# Patient Record
Sex: FEMALE | Race: WHITE | URBAN - METROPOLITAN AREA
[De-identification: names, ages, dates, MRNs, and addresses within clinical notes are randomized per-mention and may not be internally consistent; named-entity substitution may affect disease eponyms.]

---

## 2017-02-26 ENCOUNTER — EMERGENCY (EMERGENCY)
Facility: HOSPITAL | Age: 62
LOS: 0 days | Discharge: HOME | End: 2017-02-26

## 2017-06-27 DIAGNOSIS — I10 ESSENTIAL (PRIMARY) HYPERTENSION: ICD-10-CM

## 2017-06-27 DIAGNOSIS — M25.512 PAIN IN LEFT SHOULDER: ICD-10-CM

## 2017-06-27 DIAGNOSIS — Z88.8 ALLERGY STATUS TO OTHER DRUGS, MEDICAMENTS AND BIOLOGICAL SUBSTANCES STATUS: ICD-10-CM

## 2018-07-14 ENCOUNTER — EMERGENCY (EMERGENCY)
Facility: HOSPITAL | Age: 63
LOS: 0 days | Discharge: HOME | End: 2018-07-14
Admitting: PHYSICIAN ASSISTANT

## 2018-07-14 VITALS
TEMPERATURE: 98 F | RESPIRATION RATE: 18 BRPM | OXYGEN SATURATION: 99 % | HEART RATE: 89 BPM | SYSTOLIC BLOOD PRESSURE: 148 MMHG | DIASTOLIC BLOOD PRESSURE: 79 MMHG

## 2018-07-14 DIAGNOSIS — M79.675 PAIN IN LEFT TOE(S): ICD-10-CM

## 2018-07-14 DIAGNOSIS — L08.9 LOCAL INFECTION OF THE SKIN AND SUBCUTANEOUS TISSUE, UNSPECIFIED: ICD-10-CM

## 2018-07-14 NOTE — ED PROVIDER NOTE - PHYSICAL EXAMINATION
Physical Exam    Vital Signs: I have reviewed the initial vital signs.  Constitutional: well-nourished, appears stated age, no acute distress  Skin: left big toe: + paronychia with purulent material noted beneath toe nail  Neuro: AOx3, Motor 5/5, Sensation: equal and intact

## 2018-07-14 NOTE — ED PROVIDER NOTE - OBJECTIVE STATEMENT
61 yo female hx of HTN, RA, fibromyalgia, anemia here for left big toe infection since yesterday. No fever. Patient has prior sx to left big toe with bunion implant removed in 2002.

## 2018-07-14 NOTE — ED PROVIDER NOTE - NS ED ROS FT
Constitutional: (-) fever  Skin: (+) left big toe infection  Neurological: (-) altered mental status

## 2018-08-07 ENCOUNTER — EMERGENCY (EMERGENCY)
Facility: HOSPITAL | Age: 63
LOS: 0 days | Discharge: HOME | End: 2018-08-07
Attending: EMERGENCY MEDICINE | Admitting: EMERGENCY MEDICINE

## 2018-08-07 VITALS
OXYGEN SATURATION: 98 % | TEMPERATURE: 98 F | DIASTOLIC BLOOD PRESSURE: 70 MMHG | RESPIRATION RATE: 18 BRPM | HEART RATE: 87 BPM | SYSTOLIC BLOOD PRESSURE: 127 MMHG

## 2018-08-07 VITALS
SYSTOLIC BLOOD PRESSURE: 130 MMHG | OXYGEN SATURATION: 97 % | HEART RATE: 85 BPM | DIASTOLIC BLOOD PRESSURE: 63 MMHG | RESPIRATION RATE: 20 BRPM | TEMPERATURE: 98 F

## 2018-08-07 DIAGNOSIS — R73.9 HYPERGLYCEMIA, UNSPECIFIED: ICD-10-CM

## 2018-08-07 DIAGNOSIS — M79.675 PAIN IN LEFT TOE(S): ICD-10-CM

## 2018-08-07 DIAGNOSIS — L08.9 LOCAL INFECTION OF THE SKIN AND SUBCUTANEOUS TISSUE, UNSPECIFIED: ICD-10-CM

## 2018-08-07 DIAGNOSIS — I10 ESSENTIAL (PRIMARY) HYPERTENSION: ICD-10-CM

## 2018-08-07 PROBLEM — D64.9 ANEMIA, UNSPECIFIED: Chronic | Status: ACTIVE | Noted: 2018-07-14

## 2018-08-07 LAB
ANION GAP SERPL CALC-SCNC: 15 MMOL/L — HIGH (ref 7–14)
BASOPHILS # BLD AUTO: 0.11 K/UL — SIGNIFICANT CHANGE UP (ref 0–0.2)
BASOPHILS NFR BLD AUTO: 1.2 % — HIGH (ref 0–1)
BUN SERPL-MCNC: 7 MG/DL — LOW (ref 10–20)
CALCIUM SERPL-MCNC: 9.2 MG/DL — SIGNIFICANT CHANGE UP (ref 8.5–10.1)
CHLORIDE SERPL-SCNC: 98 MMOL/L — SIGNIFICANT CHANGE UP (ref 98–110)
CO2 SERPL-SCNC: 27 MMOL/L — SIGNIFICANT CHANGE UP (ref 17–32)
CREAT SERPL-MCNC: <0.5 MG/DL — LOW (ref 0.7–1.5)
EOSINOPHIL # BLD AUTO: 0.15 K/UL — SIGNIFICANT CHANGE UP (ref 0–0.7)
EOSINOPHIL NFR BLD AUTO: 1.7 % — SIGNIFICANT CHANGE UP (ref 0–8)
GLUCOSE SERPL-MCNC: 274 MG/DL — HIGH (ref 70–99)
HCT VFR BLD CALC: 34.8 % — LOW (ref 37–47)
HGB BLD-MCNC: 10.3 G/DL — LOW (ref 12–16)
IMM GRANULOCYTES NFR BLD AUTO: 0.3 % — SIGNIFICANT CHANGE UP (ref 0.1–0.3)
LACTATE SERPL-SCNC: 1.3 MMOL/L — SIGNIFICANT CHANGE UP (ref 0.5–2.2)
LYMPHOCYTES # BLD AUTO: 2.53 K/UL — SIGNIFICANT CHANGE UP (ref 1.2–3.4)
LYMPHOCYTES # BLD AUTO: 28.6 % — SIGNIFICANT CHANGE UP (ref 20.5–51.1)
MCHC RBC-ENTMCNC: 20.2 PG — LOW (ref 27–31)
MCHC RBC-ENTMCNC: 29.6 G/DL — LOW (ref 32–37)
MCV RBC AUTO: 68.2 FL — LOW (ref 81–99)
MONOCYTES # BLD AUTO: 0.82 K/UL — HIGH (ref 0.1–0.6)
MONOCYTES NFR BLD AUTO: 9.3 % — SIGNIFICANT CHANGE UP (ref 1.7–9.3)
NEUTROPHILS # BLD AUTO: 5.21 K/UL — SIGNIFICANT CHANGE UP (ref 1.4–6.5)
NEUTROPHILS NFR BLD AUTO: 58.9 % — SIGNIFICANT CHANGE UP (ref 42.2–75.2)
NRBC # BLD: 0 /100 WBCS — SIGNIFICANT CHANGE UP (ref 0–0)
PLATELET # BLD AUTO: 300 K/UL — SIGNIFICANT CHANGE UP (ref 130–400)
POTASSIUM SERPL-MCNC: 4.2 MMOL/L — SIGNIFICANT CHANGE UP (ref 3.5–5)
POTASSIUM SERPL-SCNC: 4.2 MMOL/L — SIGNIFICANT CHANGE UP (ref 3.5–5)
RBC # BLD: 5.1 M/UL — SIGNIFICANT CHANGE UP (ref 4.2–5.4)
RBC # FLD: 17 % — HIGH (ref 11.5–14.5)
SODIUM SERPL-SCNC: 140 MMOL/L — SIGNIFICANT CHANGE UP (ref 135–146)
WBC # BLD: 8.85 K/UL — SIGNIFICANT CHANGE UP (ref 4.8–10.8)
WBC # FLD AUTO: 8.85 K/UL — SIGNIFICANT CHANGE UP (ref 4.8–10.8)

## 2018-08-07 RX ORDER — CEFAZOLIN SODIUM 1 G
1000 VIAL (EA) INJECTION ONCE
Qty: 0 | Refills: 0 | Status: COMPLETED | OUTPATIENT
Start: 2018-08-07 | End: 2018-08-07

## 2018-08-07 RX ORDER — AZTREONAM 2 G
1 VIAL (EA) INJECTION
Qty: 20 | Refills: 0 | OUTPATIENT
Start: 2018-08-07 | End: 2018-08-16

## 2018-08-07 RX ORDER — CEPHALEXIN 500 MG
1 CAPSULE ORAL
Qty: 40 | Refills: 0 | OUTPATIENT
Start: 2018-08-07 | End: 2018-08-16

## 2018-08-07 RX ORDER — TETANUS TOXOID, REDUCED DIPHTHERIA TOXOID AND ACELLULAR PERTUSSIS VACCINE, ADSORBED 5; 2.5; 8; 8; 2.5 [IU]/.5ML; [IU]/.5ML; UG/.5ML; UG/.5ML; UG/.5ML
0.5 SUSPENSION INTRAMUSCULAR ONCE
Qty: 0 | Refills: 0 | Status: COMPLETED | OUTPATIENT
Start: 2018-08-07 | End: 2018-08-07

## 2018-08-07 RX ADMIN — TETANUS TOXOID, REDUCED DIPHTHERIA TOXOID AND ACELLULAR PERTUSSIS VACCINE, ADSORBED 0.5 MILLILITER(S): 5; 2.5; 8; 8; 2.5 SUSPENSION INTRAMUSCULAR at 19:14

## 2018-08-07 RX ADMIN — Medication 100 MILLIGRAM(S): at 18:45

## 2018-08-07 NOTE — ED PROVIDER NOTE - MEDICAL DECISION MAKING DETAILS
The patient wishes to leave against medical advice.  I have discussed the risks, benefits and alternatives (including the possibility of worsening of disease, pain, permanent disability, and/or death) with the patient and his/her family (if available).  The patient voices understanding of these risks, benefits, and alternatives and still wishes to sign out against medical advice.  The patient is awake, alert, oriented  x 3 and has demonstrated capacity to refuse/direct care.  I have advised the patient that they can and should return immediately should they develop any worse/different/additional symptoms, or if they change their mind and want to continue their care.

## 2018-08-07 NOTE — ED PROVIDER NOTE - OBJECTIVE STATEMENT
63F pmh RA not on meds, HTN no meds, p/w L great toe infection. seen in ED 7/14 had I&D for paronychia, given augmentin, finished abx 10 day course. states symptoms of redness, swelling, pain never went away. no fever, chills. no truama. no numbness, weakness, tingling. no  hx DM. no pmd. no cp, sob.

## 2018-08-07 NOTE — ED ADULT NURSE NOTE - NSIMPLEMENTINTERV_GEN_ALL_ED
Implemented All Fall Risk Interventions:  [place] to call system. Call bell, personal items and telephone within reach. Instruct patient to call for assistance. Room bathroom lighting operational. Non-slip footwear when patient is off stretcher. Physically safe environment: no spills, clutter or unnecessary equipment. Stretcher in lowest position, wheels locked, appropriate side rails in place. Provide visual cue, wrist band, yellow gown, etc. Monitor gait and stability. Monitor for mental status changes and reorient to person, place, and time. Review medications for side effects contributing to fall risk. Reinforce activity limits and safety measures with patient and family.

## 2018-08-07 NOTE — ED PROVIDER NOTE - CARE PLAN
Principal Discharge DX:	Toe infection  Secondary Diagnosis:	Hyperglycemia Principal Discharge DX:	Toe infection  Assessment and plan of treatment:	L great toe cellulitis, failed i&D, po abx, will do labs, culture, XR, podiatry consult, re-kandice  Secondary Diagnosis:	Hyperglycemia

## 2018-08-07 NOTE — ED ADULT NURSE NOTE - OBJECTIVE STATEMENT
Pt presents to ED c/o left great toe infection . on po abx x 10 days. redness swelling and warmth noted . denies fever. Pt ambulatory with steady gait.

## 2018-08-07 NOTE — ED PROVIDER NOTE - PROGRESS NOTE DETAILS
Podiatry resident Aileen consulted [phone], will see pt Podiatry resident Aileen removed toenail, attempted i&d but no pus, dressed and cleaned wound, seen by Attending Dr Willingham, they recommended admission for iv abx and ID eval since failed po and i&d recently but pt refused, understands risk of death, loss of toe/limb, severe sepsis, severe morbidity/disability, has capacity to refuse, understands can change her mind anytime, will f/u dr willingham in 5-7 days, bactrim/keflex as outpt, strict return precautions provided. Podiatry resident Aileen removed toenail, attempted i&d but no pus, dressed and cleaned wound, seen by Attending Dr Willingham, they recommended admission for iv abx and ID eval since failed po and i&d recently but pt refused, understands risk of death, loss of toe/limb, severe sepsis, severe morbidity/disability, has capacity to refuse, understands can change her mind anytime, will f/u dr willingham in 5-7 days, bactrim/keflex as outpt, strict return precautions provided. map clinic info given for hyperglycemia

## 2018-08-07 NOTE — ED PROVIDER NOTE - NS ED ROS FT
Review of Systems:  	•	CONSTITUTIONAL - No fever, No diaphoresis, No weight change  	•	SKIN - No rash  	•	HEMATOLOGIC - No abnormal bleeding or bruising  	•	EYES - No eye pain, No blurred vision  	•	ENT - No change in hearing, No sore throat, No neck pain, No rhinorrhea, No ear pain  	•	RESPIRATORY - No shortness of breath, No cough  	•	CARDIAC - No chest pain, No palpitations  	•	GI - No abdominal pain, No nausea, No vomiting, No diarrhea, No constipation, No bright red blood per rectum or melena.                      •                 - No dysuria, frequency, hematuria.   	•	ENDO - No polydypsia, No polyuria, No heat/cold intolerance  	•	MUSCULOSKELETAL - No joint paint, No swelling, No back pain. +toe pain  	•	NEUROLOGIC - No numbness, No focal weakness, No headache, No dizziness

## 2018-08-07 NOTE — ED PROVIDER NOTE - PHYSICAL EXAMINATION
VITAL SIGNS: AFebrile, vital signs stable  CONSTITUTIONAL: Well-developed; well-nourished; in no acute distress.  SKIN: Skin exam is warm and dry, no acute rash.  HEAD: Normocephalic; atraumatic.  EYES: Pupils equal round reactive to light, Extraocular movements intact; conjunctiva and sclera clear.  ENT: No nasal discharge; airway clear. Moist mucus membranes.  NECK: Supple; non tender. No rigidity  CARD: Regular rate and rhythm. Normal S1, S2; no murmurs, gallops, or rubs.  RESP: Lungs clear to auscultation bilaterally. No wheezes, rales or rhonchi.  ABD: Abdomen soft; non-tender; non-distended;  no hepatosplenomegaly. No costovertebral angle tenderness.   EXT: Normal ROM. No clubbing, cyanosis or edema. No calf tenderness to palpation. L great toe erythema/edema/warmth +ttp at distal tip, no fluctuance, no discharge, no odor, FROM, 5/5 motor, SILT, 2+ dp pulse, normal gaitl   NEURO: Alert and oriented x 3. No focal deficits.  PSYCH: Cooperative, appropriate.

## 2018-08-07 NOTE — CONSULT NOTE ADULT - SUBJECTIVE AND OBJECTIVE BOX
PODIATRY CONSULT   AGUSTIN ZAPATA is a 63y old  Female who presents with a chief complaint of left hallux infection.  HPI:  63y old  Female with PMH of Anemia, RA, fibromyalgia, s/p Martines implant presents to ED w cc of left hallux infection with redness, swelling, warmth. Pt came to ED 7/14/18 with the same cc. Pt treated with I&D at bedside and PO Augmentin 10 days. Pt reports no improvement from PO ABx. Pt started to developed more swelling, redness, warmth, and pain. Pt reports small amounts of pus discharge. Pt soaks her feet daily in water/vinegar/salts. Daily dressing changes triple ABx ointment. Pt denies n/v/c/d/f/sob. Pt does not want to be admitted due to social issues. pt accompanied by her son      Anemia      PMH: Anemia    PSH:   Medication   Allergy: Demerol HCl (Unknown)        Labs:                        10.3   8.85  )-----------( 300      ( 07 Aug 2018 13:54 )             34.8       08-07    140  |  98  |  7<L>  ----------------------------<  274<H>  4.2   |  27  |  <0.5<L>    Ca    9.2      07 Aug 2018 13:54              O:   Derm: No open ulcers. edema, warmth, erythema, L hallux. no pus discharge + active sign of infection  Vascular: Dorsalis Pedis and Posterior Tibial pulses 2/4 b/l.  Capillary re-fill time less then 3 seconds digits 1-5 bilateral.  B/L feet warm to touch  Neuro: Protective sensation intact to the level of the digits bilateral.  MSK: Muscle strength 5/5 all major muscle groups bilateral.        Assessment and Plan:   Pt with cellulites L hallux  Chart reviewed and Patient evaluated  Discussed diagnosis and treatment with patient    Total nail avulsion  After sterile preparation of the area 10cc injection of 2% plain Lidocaine around the distal aspect of 1st met.   Prepping of the L hallux with betadine. Toe Tourniquet used for hemostasis.      Wound flush with normal saline  Applied --- with dry sterile dressing  Offloading to bilateral Heels.   Obtained wound culture to be sent to Pathology  X-rays ordered/reviewed : Shows -------  Recommend ID/Vascular consult  Continue IV abx as per ID  Arterial duplex ordered  Weight bearing/Non-weight bearing  to ------------  Discussed importance of daily foot examinations and proper shoe gear and to importance of lower Fasting Blood Glucose levels.   Podiatry will follow while in house.   will discuss care plan  with all  Attendings ------  Pt to OR for Excisional debridement  on non viable soft  tissue  ---- base down to subcutaneous tissue red granular base  Right/ Left foot ulceration  Medical clearance requested PODIATRY CONSULT   AGUSTIN ZAPATA is a 63y old  Female who presents with a chief complaint of left hallux infection.  HPI:  63y old  Female with PMH of Anemia, RA, fibromyalgia, s/p Martines implant presents to ED w cc of left hallux infection with redness, swelling, warmth. Pt came to ED 7/14/18 with the same cc. Pt treated with I&D at bedside and PO Augmentin 10 days. Pt reports no improvement from PO ABx. Pt started to developed more swelling, redness, warmth, and pain. Pt reports small amounts of pus discharge. Pt soaks her feet daily in water/vinegar/salts. Daily dressing changes triple ABx ointment. Pt denies n/v/c/d/f/sob. Pt does not want to be admitted due to social issues. pt accompanied by her son      Anemia      PMH: Anemia    PSH:   Medication   Allergy: Demerol HCl (Unknown)        Labs:                        10.3   8.85  )-----------( 300      ( 07 Aug 2018 13:54 )             34.8       08-07    140  |  98  |  7<L>  ----------------------------<  274<H>  4.2   |  27  |  <0.5<L>    Ca    9.2      07 Aug 2018 13:54              O:   Derm: No open ulcers. edema, warmth, erythema, L hallux. no pus discharge + active sign of infection  Vascular: Dorsalis Pedis and Posterior Tibial pulses 2/4 b/l.  Capillary re-fill time less then 3 seconds digits 1-5 bilateral.  B/L feet warm to touch  Neuro: Protective sensation intact to the level of the digits bilateral.  MSK: Muscle strength 5/5 all major muscle groups bilateral.        Assessment and Plan:   Pt with cellulites L hallux  Chart reviewed and Patient evaluated  Discussed diagnosis and treatment with patient    Total nail avulsion  After sterile preparation of the area 10cc injection of 2% plain Lidocaine around the distal aspect of 1st met.   Prepping of the L hallux with betadine. Toe Tourniquet used for hemostasis.  Removal of toe nail using steril tools from the laceration  kit.   No pus discharge.  <1cc of blood.    40 cc of Betadine/saline flushes   Dressing xeroform/DSD/Kerlix  Pt tolerated the procedure. No complications    X-rays : Shows no OM  Recommend ID consult  Dispense Darco shoe  Dressing changes instruction:  Clean the area with water and soap  Xeroform/DSD/Cling/Tape BID  NO Weight bearing restriction  Discussed care plan  with Attending  F/u with Dr. Willingham as o/p   [address] [place] [address] PODIATRY CONSULT   AGUSTIN ZAPATA is a 63y old  Female who presents with a chief complaint of left hallux infection.  HPI:  63y old  Female with PMH of Anemia, RA, fibromyalgia, s/p Martines implant presents to ED w cc of left hallux infection with redness, swelling, warmth. Pt came to ED 7/14/18 with the same cc. Pt treated with I&D at bedside and PO Amoxicillin 10 days. Pt reports no improvement from PO ABx. Pt started to developed more swelling, redness, warmth, and pain which comes and goes . Pt reports small amounts of pus discharge. Pt soaks her feet daily in water/vinegar/salts. Daily dressing changes triple ABx ointment. Pt denies n/v/c/d/f/sob. Pt does not want to be admitted due to social issues. pt accompanied by her son      Anemia      PMH: Anemia    PSH:   Medication   Allergy: Demerol HCl (Unknown)        Labs:                        10.3   8.85  )-----------( 300      ( 07 Aug 2018 13:54 )             34.8       08-07    140  |  98  |  7<L>  ----------------------------<  274<H>  4.2   |  27  |  <0.5<L>    Ca    9.2      07 Aug 2018 13:54              O:   Derm: No open ulcers. edema, warmth, erythema, L hallux. no pus discharge + active sign of infection  Vascular: Dorsalis Pedis and Posterior Tibial pulses 2/4 b/l.  Capillary re-fill time less then 3 seconds digits 1-5 bilateral.  B/L feet warm to touch  Neuro: Protective sensation intact to the level of the digits bilateral.  MSK: Muscle strength 5/5 all major muscle groups bilateral.        Assessment and Plan:   Pt with cellulites L hallux  Chart reviewed and Patient evaluated  Discussed diagnosis and treatment with patient    Total nail avulsion  After sterile preparation of the area 10cc injection of 2% plain Lidocaine around the distal aspect of 1st met.   Prepping of the L hallux with betadine. Toe Tourniquet used for hemostasis.  Removal of toe nail using steril tools from the laceration  kit.   No pus discharge.  <1cc of blood.    40 cc of Betadine/saline flushes   Dressing xeroform/DSD/Kerlix  Pt tolerated the procedure. No complications    X-rays : Shows no OM  Recommend ID consult  Dispense Darco shoe L foot  Dressing changes instruction:  Clean the area with water and soap  Xeroform/DSD/Cling/Tape BID  NO Weight bearing restriction  recommend tetanus shot  Discussed care plan  with Attending  F/u with Dr. Willingham as o/p   [address] [place] [address] PODIATRY CONSULT   GAUSTIN ZAPATA is a 63y old  Female who presents with a chief complaint of left hallux infection.  HPI:  63y old  Female with PMH of Anemia, RA, fibromyalgia, s/p Martines implant presents to ED w cc of left hallux infection with redness, swelling, warmth. Pt came to ED 7/14/18 with the same cc. Pt treated with I&D at bedside and PO Amoxicillin 10 days. Pt reports no improvement from PO ABx. Pt started to developed more swelling, redness, warmth, and pain which comes and goes . Pt reports small amounts of pus discharge. Pt soaks her feet daily in water/vinegar/salts. Daily dressing changes triple ABx ointment. Pt denies n/v/c/d/f/sob. Pt does not want to be admitted due to social issues. pt accompanied by her son      Anemia      PMH: Anemia    PSH:   Medication   Allergy: Demerol HCl (Unknown)        Labs:                        10.3   8.85  )-----------( 300      ( 07 Aug 2018 13:54 )             34.8       08-07    140  |  98  |  7<L>  ----------------------------<  274<H>  4.2   |  27  |  <0.5<L>    Ca    9.2      07 Aug 2018 13:54              O:   Derm: No open ulcers. edema, warmth, erythema, L hallux. no pus discharge +cellulitis  Vascular: Dorsalis Pedis and Posterior Tibial pulses 2/4 b/l.  Capillary re-fill time less then 3 seconds digits 1-5 bilateral.  B/L feet warm to touch  Neuro: Protective sensation intact to the level of the digits bilateral.  MSK: Muscle strength 5/5 all major muscle groups bilateral.        Assessment and Plan:   Cellulitis L hallux  Chart reviewed and Patient evaluated  Discussed diagnosis and treatment with patient    Total nail avulsion L hallux performed due to need to investigate potential soft tissue infection coming from diseased toenail  After sterile preparation of the area 10cc injection of 2% plain Lidocaine around the distal aspect of 1st met.   Prepping of the L hallux with betadine. Toe Tourniquet used for hemostasis.  Removal of toe nail using sterile tools from the laceration  kit.   No pus discharge.  <1cc of blood.    40 cc of Betadine/saline flushes   Dressing xeroform/DSD/Kerlix  Pt tolerated the procedure. No complications    X-rays : Shows no OM  Recommend ID consult  Recommend dose of IV antibiotics in ED until ID physician evaluates patient  Dispense Darco shoe L foot  Dressing changes instruction:  Clean the area with normal saline  Xeroform/DSD/[place]/Tape to do twice a day  No Weight bearing restriction  recommend tetanus shot  Discussed care plan  with Attending  F/u with Dr. Willingham as o/p   [address] [place] [address]

## 2018-08-07 NOTE — ED PEDIATRIC TRIAGE NOTE - CHIEF COMPLAINT QUOTE
patient has left great toe infection . on po abx x 10 days. redness swelling and warmth noted . denies fever

## 2018-08-07 NOTE — ED ADULT NURSE NOTE - EXPLANATION OF PATIENT'S REASON FOR LEAVING
Pt requesting to follow up outpatient. Pt educated of the risk of leaving AMA by MD including blood clots, CVA and death, pt verbalized understanding but leaving AMA. VSS no s/s distress observed. Shoe sized and provided to pt. Pt ambulatory with steady gait.

## 2018-08-13 LAB
CULTURE RESULTS: SIGNIFICANT CHANGE UP
SPECIMEN SOURCE: SIGNIFICANT CHANGE UP

## 2019-01-07 ENCOUNTER — OUTPATIENT (OUTPATIENT)
Dept: OUTPATIENT SERVICES | Facility: HOSPITAL | Age: 64
LOS: 1 days | Discharge: HOME | End: 2019-01-07

## 2019-01-07 DIAGNOSIS — D64.9 ANEMIA, UNSPECIFIED: ICD-10-CM

## 2019-01-07 DIAGNOSIS — E78.00 PURE HYPERCHOLESTEROLEMIA, UNSPECIFIED: ICD-10-CM

## 2019-01-07 DIAGNOSIS — G93.3 POSTVIRAL AND RELATED FATIGUE SYNDROMES: ICD-10-CM

## 2019-08-04 ENCOUNTER — EMERGENCY (EMERGENCY)
Facility: HOSPITAL | Age: 64
LOS: 0 days | Discharge: HOME | End: 2019-08-04
Attending: EMERGENCY MEDICINE | Admitting: EMERGENCY MEDICINE
Payer: COMMERCIAL

## 2019-08-04 VITALS
DIASTOLIC BLOOD PRESSURE: 78 MMHG | OXYGEN SATURATION: 98 % | HEART RATE: 91 BPM | HEIGHT: 62 IN | WEIGHT: 154.54 LBS | TEMPERATURE: 98 F | SYSTOLIC BLOOD PRESSURE: 140 MMHG | RESPIRATION RATE: 18 BRPM
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
## 2019-08-04 DIAGNOSIS — T85.694A OTHER MECHANICAL COMPLICATION OF INSULIN PUMP, INITIAL ENCOUNTER: ICD-10-CM
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
## 2019-08-04 PROCEDURE — 99282 EMERGENCY DEPT VISIT SF MDM: CPT

## 2019-08-04 NOTE — ED ADULT NURSE NOTE - NSIMPLEMENTINTERV_GEN_ALL_ED
Implemented All [place] Safety Interventions:  [place] to call system. Call bell, personal items and telephone within reach. Instruct patient to call for assistance. Room bathroom lighting operational. Non-slip footwear when patient is off stretcher. Physically safe environment: no spills, clutter or unnecessary equipment. Stretcher in lowest position, wheels locked, appropriate side rails in place.

## 2019-08-04 NOTE — ED PROVIDER NOTE - CROS ED CARDIOVAS ALL NEG
[address]    PATIENT'S NAME: Zakia WELLS   ATTENDING PHYSICIAN: Bharti Bolden D.O.   OPERATING PHYSICIAN: Modesta Gutierrez M.D.    PATIENT ACCOUNT#:   [de-identified]    LOCATION:  [address] [place] [address]  MEDICAL RECORD #:   [id number]       DATE OF BIRTH:  [birth year]  t: 02/26/2017 20:07:14  Job [id number]  / negative...

## 2019-08-04 NOTE — ED PROVIDER NOTE - CLINICAL SUMMARY MEDICAL DECISION MAKING FREE TEXT BOX
pt aware of FS, repeated her own FS with a manual machine and that one is working, aware of signs and symptoms to return for, insulin pump working properly, reports will follow up with pmd as well and obtain new machine tomorrow.

## 2019-08-04 NOTE — ED ADULT NURSE NOTE - CHPI ED NUR SYMPTOMS NEG
no dizziness/no vomiting/no decreased eating/drinking/no weakness/no nausea/no pain/no fever/no chills/no tingling

## 2019-08-04 NOTE — ED PROVIDER NOTE - CARE PLAN
Principal Discharge DX:	Insulin pump mechanical complication Principal Discharge DX:	Insulin pump mechanical complication  Assessment and plan of treatment:	Plan: FS, reassess.

## 2019-08-04 NOTE — ED PROVIDER NOTE - ATTENDING CONTRIBUTION TO CARE
65 y/o f w/ pmhx of dm with insulin pump, fibromyalgia, RA, presents for FS as pt reports and FS machine was not working, kept reading low so she ate more, and was not sure if it wad accurate. pt has a manual one where she takes her FS at home, used it in ed to make sure that work and Fs 1was 180 reports will use this one until tomorrow where she will get a new FS monitor. denies any symptoms. No fever, chills, n/v, cp,  pleuritic cp, sob, palpitations, diaphoresis, cough, ha/lh/dizziness, numbness/tingling, neck pain/ stiffness, abd pain, diarrhea, constipation, melena/brbpr, urinary symptoms, trauma, weakness, edema, calf pain/swelling/erythema, sick contacts, recent travel or rash.  Vital Signs: I have reviewed the initial vital signs. Constitutional: WDWN in nad. Sitting on stretcher speaking full sentences. Integumentary: No rash. ENT: MMM NECK: Supple, non-tender, no meningeal signs. Cardiovascular: RRR, radial pulses 2/4 b/l. No JVD. Respiratory: BS present b/l, ctabl, no wheezing or crackles, good resp effort and excursion, good air exchange,  no accessory muscle use, no stridor. Gastrointestinal: BS present throughout all 4 quadrants, soft, nd, nt, no rebound tenderness or guarding, no cvat. Musculoskeletal: FROM, no edema, no calf pain/swelling/erythema. Neurologic: AAOx3, motor 5/5 and sensation intact throughout upper and lowe ext, CN II-XII intact, No facial droop or slurring of speech. No focal deficits.

## 2019-08-04 NOTE — ED PROVIDER NOTE - NSFOLLOWUPINSTRUCTIONS_ED_ALL_ED_FT
Insulin Pumps  Image   An insulin pump is a small, computerized, battery-powered device that steadily (continuously) delivers insulin to the body. Insulin pumps may be used to treat type 1 diabetes or type 2 diabetes. They may be used with rapid-acting insulin or short-acting insulin.    An insulin pump has a container (cartridge or reservoir) of insulin that must be refilled regularly. The cartridge is connected to a needle that is placed into the tissue between skin and muscle in any of these areas:  Abdomen. Avoid any area that is less than 2 inches (5 cm) from the belly button (navel).  Front of thigh.  Upper, outer side of thigh.  Upper, outer part of arm.  Upper, outer part of buttock.  The area where the needle is inserted is called the infusion site. The needle is surrounded by a small plastic tube (cannula). Together, the needle and cannula are referred to as the infusion set. After the infusion set is inserted under the skin, the needle is removed and the cannula stays in place to deliver insulin to the body.    What does an insulin pump do?  Insulin pumps deliver insulin to the body in the following ways:  Basal rate. This method gives small, continuous amounts of insulin 24 hours a day.  Bolus dose. This is a specific amount of insulin that you can give yourself right away. You may need this after eating a meal or when you have high blood sugar (glucose). You will be instructed on the amount to give depending on your blood glucose level reading.  What are some advantages of using an insulin pump?  The advantages of using an insulin pump vary depending on which type of diabetes you have. Advantages may include:  Reduced need to give yourself multiple insulin injections. With an insulin pump, you need to insert a needle into your body one time every 2–3 days instead of every time that you need insulin.  More control over your diabetes and more flexibility for scheduling meals, snacks, and exercise.  Reduced risk of low blood glucose (hypoglycemia).  Better management of the increases in blood glucose in the early morning (shay phenomenon).  More precise insulin doses, which may mean an improvement in blood glucose levels.  A more predictable insulin absorption rate.  Easier delivery of basal rate insulin.  What are some disadvantages of using an insulin pump?  An insulin pump and its supplies might cost more than the supplies for injections.  You may need to check your blood glucose level more often than when you give yourself injections.  You have the pump attached to you wherever you go, for 24 hours a day.  What are the risks?  The infusion site can get irritated or infected. To avoid those risks:  Care for your skin.  Change the infusion set as directed by your health care provider.  Periodically move your infusion site to a slightly different area.  Pump failure. This can be caused by a malfunction, such as a blockage in the tubing, the needle moving out of place, or the pump running out of insulin.  Most pumps have an alarm to warn you of a malfunction. However, if the pump fails and you are unaware that it has failed, you may develop diabetic ketoacidosis. This is a life-threatening complication of diabetes that can occur due to lack of insulin.  Blockage (occlusion). This can prevent your pump from delivering insulin properly. This can be caused by a bent cannula or a kink in the tubing.  What may cause high blood glucose when using an insulin pump?  Possible causes of high blood glucose (hyperglycemia) when using an insulin pump include:  Tubing that breaks, leaks, bends, or moves out of place.  Low charge in a battery.  Infection at the infusion site. Signs of infection may include:  Redness, swelling, or pain.  Blood or cloudy fluid.  Warmth.  Pus or a bad smell.  A cartridge that is empty or incorrectly loaded into the pump.  A basal rate that needs to be adjusted.  Air bubbles in the tubing.  Illness or stress.  Changes in hormone levels, such as during a menstrual cycle.  The pump delivering the wrong amount of insulin.  An interruption in insulin delivery.  Poor absorption at the infusion site, even if you recently moved the site.  Using ineffective insulin. Insulin can become ineffective if it is outdated or exposed to extreme heat or cold.  A change in your normal activity level.  How to care for your insulin pump  Refill the insulin cartridge as often as needed. Do not let the cartridge get completely empty.  Always keep extra pump supplies, syringes, and insulin with you.  Store your insulin according to instructions on the packaging.  If you have questions or a problem that you cannot solve, call your health care provider or call the pump 's customer service line.  How to manage your diabetes while using an insulin pump  Image   Check your blood glucose 4 or more times a day, or as often as directed.  Always check your blood glucose at the following times:  Before you change your basal or bolus rates.  After you change your infusion set.  Check your A1c (hemoglobin A1c) level as often as directed.  Continue to manage your diabetes as directed. This includes:  Exercising regularly.  Eating healthy foods.  Managing your weight.  If you give yourself a correction (supplemental) insulin dose and your blood glucose stays high, check your urine for ketones. A supplemental dose is a specific amount of rapid-acting or short-acting insulin that is used to lower blood glucose if it is too high. You may be instructed to check your blood glucose at certain times of the day and to use corrective insulin as needed.  If you have negative ketones, check your pump to see if it is working properly. If your pump does not seem to be working properly, change your cartridge, infusion set, and insertion site. Recheck your blood glucose in 1 hour. If your blood glucose is still high, give yourself an additional supplemental insulin dose with an injection using a syringe and needle.  If you have moderate or large ketones in your urine, give yourself an additional supplemental insulin dose with an injection using a syringe and needle. Contact your health care provider for additional instructions.  Contact a health care provider if:  You have any of the following at your infusion site:  Redness, swelling, or pain.  Blood or cloudy fluid.  Warmth.  Pus or a bad smell.  A red streak on your skin that leads away from the infusion site.  You have a fever.  You have moderate or large ketone levels in your urine.  Your pump is not working properly.  Your blood glucose is higher or lower than the target range that your health care provider gave you.  Summary  An insulin pump is a small, computerized, battery-powered device that steadily (continuously) delivers insulin to the body.  Insulin pumps may be used to treat type 1 diabetes (type 1 diabetes mellitus) or type 2 diabetes (type 2 diabetes mellitus).  After the infusion set is inserted under the skin, the needle is removed and the cannula stays in place to deliver insulin to the body.  Always check your blood glucose before you change your basal or bolus rates, and after you change your infusion set.  Refill the insulin cartridge as often as needed. Do not let the cartridge get completely empty. Always keep extra pump supplies, syringes, and insulin with you.  This information is not intended to replace advice given to you by your health care provider. Make sure you discuss any questions you have with your health care provider.

## 2019-08-04 NOTE — ED ADULT NURSE NOTE - OBJECTIVE STATEMENT
patient complaints her insulin monitor is malfunctioning and patient wants to know her blood sugar level.

## 2019-08-04 NOTE — ED PROVIDER NOTE - GASTROINTESTINAL, MLM
Abdomen soft, non-tender, non distended, insulin pump at the LUQ with no redness, no pain, no rebound, no rigidity, no guarding.

## 2019-08-04 NOTE — ED PROVIDER NOTE - PROGRESS NOTE DETAILS
Discussed need to follow up with PMD. Discussed signs and symptoms to return to the ED for. Pt understands and agrees with discharge plan.

## 2019-08-04 NOTE — ED PROVIDER NOTE - OBJECTIVE STATEMENT
65 yo F with hx of DM with insulin pump, HTN not on meds, RA not on meds, presents for evaluation of malfunctioning insulin measuring device, onset today, with no associated symptoms. No fever, no chills, no headache, no nausea, no vomiting, no chest pain, no back pain, no abdominal pain, no SOB. Pt states that her device was telling her, the sugars were low, she compensated and then it read high as 430s. Pt states that she then switched to manual and her sugars have been at 160s. NO other symptoms

## 2020-06-07 ENCOUNTER — TRANSCRIPTION ENCOUNTER (OUTPATIENT)
Age: 65
End: 2020-06-07

## 2021-03-07 ENCOUNTER — EMERGENCY (EMERGENCY)
Facility: HOSPITAL | Age: 66
LOS: 0 days | Discharge: HOME | End: 2021-03-07
Attending: STUDENT IN AN ORGANIZED HEALTH CARE EDUCATION/TRAINING PROGRAM | Admitting: STUDENT IN AN ORGANIZED HEALTH CARE EDUCATION/TRAINING PROGRAM
Payer: COMMERCIAL

## 2021-03-07 ENCOUNTER — TRANSCRIPTION ENCOUNTER (OUTPATIENT)
Age: 66
End: 2021-03-07

## 2021-03-07 VITALS
WEIGHT: 177.03 LBS | OXYGEN SATURATION: 99 % | TEMPERATURE: 100 F | HEIGHT: 62 IN | DIASTOLIC BLOOD PRESSURE: 74 MMHG | SYSTOLIC BLOOD PRESSURE: 160 MMHG | RESPIRATION RATE: 18 BRPM | HEART RATE: 97 BPM

## 2021-03-07 DIAGNOSIS — R04.0 EPISTAXIS: ICD-10-CM

## 2021-03-07 DIAGNOSIS — Z88.8 ALLERGY STATUS TO OTHER DRUGS, MEDICAMENTS AND BIOLOGICAL SUBSTANCES STATUS: ICD-10-CM

## 2021-03-07 DIAGNOSIS — I10 ESSENTIAL (PRIMARY) HYPERTENSION: ICD-10-CM

## 2021-03-07 DIAGNOSIS — E11.9 TYPE 2 DIABETES MELLITUS WITHOUT COMPLICATIONS: ICD-10-CM

## 2021-03-07 DIAGNOSIS — E03.9 HYPOTHYROIDISM, UNSPECIFIED: ICD-10-CM

## 2021-03-07 LAB
ALBUMIN SERPL ELPH-MCNC: 4.1 G/DL — SIGNIFICANT CHANGE UP (ref 3.5–5.2)
ALP SERPL-CCNC: 144 U/L — HIGH (ref 30–115)
ALT FLD-CCNC: 17 U/L — SIGNIFICANT CHANGE UP (ref 0–41)
ANION GAP SERPL CALC-SCNC: 9 MMOL/L — SIGNIFICANT CHANGE UP (ref 7–14)
APTT BLD: 35.3 SEC — SIGNIFICANT CHANGE UP (ref 27–39.2)
AST SERPL-CCNC: 14 U/L — SIGNIFICANT CHANGE UP (ref 0–41)
BASOPHILS # BLD AUTO: 0.12 K/UL — SIGNIFICANT CHANGE UP (ref 0–0.2)
BASOPHILS NFR BLD AUTO: 1.1 % — HIGH (ref 0–1)
BILIRUB SERPL-MCNC: <0.2 MG/DL — SIGNIFICANT CHANGE UP (ref 0.2–1.2)
BLD GP AB SCN SERPL QL: SIGNIFICANT CHANGE UP
BUN SERPL-MCNC: 21 MG/DL — HIGH (ref 10–20)
CALCIUM SERPL-MCNC: 8.9 MG/DL — SIGNIFICANT CHANGE UP (ref 8.5–10.1)
CHLORIDE SERPL-SCNC: 107 MMOL/L — SIGNIFICANT CHANGE UP (ref 98–110)
CO2 SERPL-SCNC: 24 MMOL/L — SIGNIFICANT CHANGE UP (ref 17–32)
CREAT SERPL-MCNC: 0.6 MG/DL — LOW (ref 0.7–1.5)
EOSINOPHIL # BLD AUTO: 0.14 K/UL — SIGNIFICANT CHANGE UP (ref 0–0.7)
EOSINOPHIL NFR BLD AUTO: 1.3 % — SIGNIFICANT CHANGE UP (ref 0–8)
GLUCOSE SERPL-MCNC: 153 MG/DL — HIGH (ref 70–99)
HCT VFR BLD CALC: 34.2 % — LOW (ref 37–47)
HGB BLD-MCNC: 10.5 G/DL — LOW (ref 12–16)
IMM GRANULOCYTES NFR BLD AUTO: 0.5 % — HIGH (ref 0.1–0.3)
INR BLD: 1.14 RATIO — SIGNIFICANT CHANGE UP (ref 0.65–1.3)
LYMPHOCYTES # BLD AUTO: 2.13 K/UL — SIGNIFICANT CHANGE UP (ref 1.2–3.4)
LYMPHOCYTES # BLD AUTO: 20.1 % — LOW (ref 20.5–51.1)
MCHC RBC-ENTMCNC: 21.8 PG — LOW (ref 27–31)
MCHC RBC-ENTMCNC: 30.7 G/DL — LOW (ref 32–37)
MCV RBC AUTO: 71.1 FL — LOW (ref 81–99)
MONOCYTES # BLD AUTO: 0.84 K/UL — HIGH (ref 0.1–0.6)
MONOCYTES NFR BLD AUTO: 7.9 % — SIGNIFICANT CHANGE UP (ref 1.7–9.3)
NEUTROPHILS # BLD AUTO: 7.3 K/UL — HIGH (ref 1.4–6.5)
NEUTROPHILS NFR BLD AUTO: 69.1 % — SIGNIFICANT CHANGE UP (ref 42.2–75.2)
NRBC # BLD: 0 /100 WBCS — SIGNIFICANT CHANGE UP (ref 0–0)
PLATELET # BLD AUTO: 324 K/UL — SIGNIFICANT CHANGE UP (ref 130–400)
POTASSIUM SERPL-MCNC: 4.6 MMOL/L — SIGNIFICANT CHANGE UP (ref 3.5–5)
POTASSIUM SERPL-SCNC: 4.6 MMOL/L — SIGNIFICANT CHANGE UP (ref 3.5–5)
PROT SERPL-MCNC: 6.7 G/DL — SIGNIFICANT CHANGE UP (ref 6–8)
PROTHROM AB SERPL-ACNC: 13.1 SEC — HIGH (ref 9.95–12.87)
RBC # BLD: 4.81 M/UL — SIGNIFICANT CHANGE UP (ref 4.2–5.4)
RBC # FLD: 16.5 % — HIGH (ref 11.5–14.5)
SODIUM SERPL-SCNC: 140 MMOL/L — SIGNIFICANT CHANGE UP (ref 135–146)
WBC # BLD: 10.58 K/UL — SIGNIFICANT CHANGE UP (ref 4.8–10.8)
WBC # FLD AUTO: 10.58 K/UL — SIGNIFICANT CHANGE UP (ref 4.8–10.8)

## 2021-03-07 PROCEDURE — 99284 EMERGENCY DEPT VISIT MOD MDM: CPT

## 2021-03-07 RX ORDER — OXYMETAZOLINE HYDROCHLORIDE 0.5 MG/ML
2 SPRAY NASAL ONCE
Refills: 0 | Status: COMPLETED | OUTPATIENT
Start: 2021-03-07 | End: 2021-03-07

## 2021-03-07 RX ADMIN — OXYMETAZOLINE HYDROCHLORIDE 2 SPRAY(S): 0.5 SPRAY NASAL at 14:35

## 2021-03-07 NOTE — ED PROVIDER NOTE - PATIENT PORTAL LINK FT
You can access the FollowMyHealth Patient Portal offered by [facility] by registering at the following website: [email or web address]. By joining [facility]’s FollowMyHealth portal, you will also be able to view your health information using other applications (apps) compatible with our system.

## 2021-03-07 NOTE — ED PROVIDER NOTE - NSFOLLOWUPCLINICS_GEN_ALL_ED_FT
[facility]  ENT  [address], [address]nd [place] [address]  Phone: [phone]  Fax:   Follow Up Time:

## 2021-03-07 NOTE — ED PROVIDER NOTE - PROGRESS NOTE DETAILS
ENT consulted The patient wishes to leave against medical advice.  I have discussed the risks, benefits and alternatives (including the possibility of worsening of disease, pain, permanent disability, and/or death) with the patient and his/her family (if available).  The patient voices understanding of these risks, benefits, and alternatives and still wishes to sign out against medical advice.  The patient is awake, alert, oriented  x 3 and has demonstrated capacity to refuse/direct care.  I have advised the patient that they can and should return immediately should they develop any worse/different/additional symptoms, or if they change their mind and want to continue their care.

## 2021-03-07 NOTE — ED PROVIDER NOTE - CLINICAL SUMMARY MEDICAL DECISION MAKING FREE TEXT BOX
IMP: Epistaxis, in setting of prior epistaxis, fe infusion and ENT cautery. + hemostasis from b/l nasal packing [place] [facility] [place]. No further bleeding in ED. VSS. Seen by ENT. Plan to admit pt given b/l packing. pt declined, preferred to fup outpt. pt understood risk/ benefits, will fup w/ ENT. Pt understands signs and symptoms for ED return. Pt AMA'ed.

## 2021-03-07 NOTE — ED ADULT NURSE NOTE - OBJECTIVE STATEMENT
Pt reports spontaneous nosebleed starting this morning, pt denies fall denies injury, denies AC. Pt seen at urgent care today, nose packed at urgent care pt instructed to come to ED for ENT consult. patient with b/l nose packing

## 2021-03-07 NOTE — CONSULT NOTE ADULT - ASSESSMENT
65y female here for evaluation of b/l epistaxis. Epistaxis resolved after being packed by urgent care prior to. No evidence of active bleed at this time.     ·	 65y female here for evaluation of b/l epistaxis. Epistaxis resolved after being packed by urgent care prior to. No evidence of active bleed at this time.     ·	Placed mustache dressing to catch any drips  ·	No further ENT intervention indicated at this time  ·	Recommend Augmentin for gram positive coverage for the duration of the packing - explained the importance of abx to the patient for the duration of the packing, patient agreeable  ·	Patient educated on risks of nose picking/touching/scratching  ·	Patient to continue her BP medications, strict blood pressure control.  ·	Avoid nasal trauma; no nose rubbing, blowing or manipulating nasal packing.  Sneeze with mouth open and pinching nares.  ·	Avoid bending with head blow the waist, no heavy lifting.  ·	Instructed patient to f/u OP with ENT for packing removal in 48 to 72 hours

## 2021-03-07 NOTE — ED PROVIDER NOTE - ATTENDING CONTRIBUTION TO CARE
.  IMP: Epistaxis, in setting of prior epistaxis, fe infusion and ENT cautery. + hemostasis from b/l nasal packing applied in [facility] prior to arrival. No further bleeding in ED. VSS. Seen by ENT. Plan to admit pt given b/l packing. pt declined, preferred to fup outpt. pt understood risk/ benefits, will fup w/ ENT. Pt understands signs and symptoms for ED return. Pt AMA'ed.

## 2021-03-07 NOTE — ED PROVIDER NOTE - OBJECTIVE STATEMENT
65y F DM, HTN, Hypothyroid presents for eval of epistaxis. Pt has b/l epistaxis x3hrs, improved with b/l nasal packing at , no inciting factors. Pt has had prior episode requiring blood transfusion, iron transfusion and ENT cauterization. Denies fever, ha, lightheadedness, weakness

## 2021-03-07 NOTE — ED ADULT TRIAGE NOTE - CHIEF COMPLAINT QUOTE
Pt reports spontaneous nosebleed starting this morning, pt denies fall denies injury, denies AC. Pt seen at urgent care today, nose packed at urgent care pt instructed to come to ED for ENT consult.

## 2021-03-07 NOTE — ED PROVIDER NOTE - CARE PROVIDER_API CALL
Wood Hathaway)  Otolaryngology  [address] [place] [address], [address] [place] [address]  Phone: [phone]  Fax: [phone]  Follow Up Time:

## 2021-03-07 NOTE — ED PROVIDER NOTE - PHYSICAL EXAMINATION
CONST: NAD  EYES: PERRL, EOMI, Sclera and conjunctiva clear.   ENT: B/L nares packed. Oropharynx normal appearing, no erythema or exudates.  NECK: Non-tender, no meningeal signs. normal ROM. supple   CARD: S1 S2; No jvd  RESP: Equal BS B/L, No wheezes, rhonchi or rales. No distress  GI: Soft, non-tender, non-distended. normal BS  MS: Normal ROM in all extremities. pulses 2 +. no calf tenderness or swelling  SKIN: Warm, dry, no acute rashes. Good turgor  NEURO: A&Ox3, No focal deficits. Strength 5/5 with no sensory deficits. Steady gait.

## 2021-03-07 NOTE — ED PROVIDER NOTE - NSFOLLOWUPINSTRUCTIONS_ED_ALL_ED_FT
Follow up with PMD and ENT in 1-2 days.    Epistaxis    Epistaxis is the medical term for a nosebleed. Nosebleeds are common and can be caused by many conditions, such as injury, infections, dry environments, medicines, nose picking, and home heating and cooling systems. Try controlling your nosebleed by pinching your nose continuously for at least 10 minutes. Avoid lying down while you are having a nosebleed. Sit up and lean forward. Avoid blowing or sniffing your nose for a number of hours after having a nosebleed. Resume your normal activities as you are able, but avoid straining, lifting, or bending at the waist for several days. Maintain humidity in your home by using less air conditioning or by using a humidifier.     If your nose was packed by your health care provider, keep the packing inside of your nose until a health care provider removes it. If a balloon catheter was used to pack your nose, do not cut or remove it unless your health care provider has instructed you to do that.     Aspirin and blood thinners make bleeding more likely. If you are prescribed these medicines and you suffer from nosebleeds, ask your health care provider if you should stop taking the medicines or adjust the dose. Do not stop medicines unless directed by your health care provider.    SEEK IMMEDIATE MEDICAL CARE IF YOU HAVE ANY OF THE FOLLOWING SYMPTOMS: nosebleed lasting longer than 20 minutes, unusual bleeding from or bruising on other parts of your body, dizziness or lightheadedness, fainting, nosebleed occurring after a head injury, or fever.

## 2021-03-07 NOTE — ED ADULT NURSE NOTE - NSIMPLEMENTINTERV_GEN_ALL_ED
Implemented All Universal Safety Interventions:  [place] to call system. Call bell, personal items and telephone within reach. Instruct patient to call for assistance. Room bathroom lighting operational. Non-slip footwear when patient is off stretcher. Physically safe environment: no spills, clutter or unnecessary equipment. Stretcher in lowest position, wheels locked, appropriate side rails in place.

## 2021-03-07 NOTE — CONSULT NOTE ADULT - SUBJECTIVE AND OBJECTIVE BOX
ENT Consult Note: 65y female with a pmh of DM, HTN, Hypothyroid and hx of prior epistaxis requiring blood transfusion, iron transfusion and cauterization by ENT presents today for evaluation of b/l epistaxis. Patient states that her epistaxis began this morning when she was laying back relaxing on her couch. Patient said that it started out of nowhere and that she was not doing anything strenuous when the bleed began. Patient then attempted to stop the bleed by placing gauze into her nares at home but the bleeding continued prompting her to go the urgent care for further management of the bleed. Patient was packed b/l nasal packing in the urgent care and the bleed was stabilized. Patient was subsequently sent to the ED for further management and recommendations. Patient denies fever, chills, n/v, headache, dizziness, SOB, lightheadedness or weakness    PAST MEDICAL & SURGICAL HISTORY:  Anemia  DM  HTN  Hypothyroid    Allergies  Demerol HCl (Unknown)    FAMILY HISTORY:    Social History: No illicit drug use, nonsmoker    [ x ] A 10 Point Review of Systems was negative except where noted    Vital Signs Last 24 Hrs  T(C): 37.7 (07 Mar 2021 12:32), Max: 37.7 (07 Mar 2021 12:32)  T(F): 99.8 (07 Mar 2021 12:32), Max: 99.8 (07 Mar 2021 12:32)  HR: 97 (07 Mar 2021 12:32) (97 - 97)  BP: 160/74 (07 Mar 2021 12:32) (160/74 - 160/74)  RR: 18 (07 Mar 2021 12:32) (18 - 18)  SpO2: 99% (07 Mar 2021 12:32) (99% - 99%)    PHYSICAL EXAM:  Gen: Well-developed, well-nourished, NAD.  No drooling or pooling of secretions.  Good vocal quality, no hoarseness  Skin: No rashes, bruises, or lesions, good color non diaphoretic  HEENT: Head NC/AT. Nares with nasal packing in place b/l by urgent care, packing mildly saturated with blood, Oral cavity no erythema/edema. Tongue wnl, uvula midline, no tenderness throughout FOM. Posterior oropharynx clear, no blood/discharge noted.   Neck: Trachea midline, supple  Resp: Breathing easily, no accessory muscle use, no stridor or stertor.    Cardio: +S1/S2  Abd: Soft, nontender, nondistended  Neuro: Awake and alert  Psych: Normal mood, normal affect  Ext: No peripheral edema/cyanosis, ANTUNEZ x 4    LABS:             10.5   10.58 )-----------( 324      ( 07 Mar 2021 13:09 )             34.2     03-07    140  |  107  |  21<H>  ----------------------------<  153<H>  4.6   |  24  |  0.6<L>    Ca    8.9      07 Mar 2021 13:09    TPro  6.7  /  Alb  4.1  /  TBili  <0.2  /  DBili  x   /  AST  14  /  ALT  17  /  AlkPhos  144<H>  03-07    PT/INR - ( 07 Mar 2021 13:41 )   PT: 13.10 sec;   INR: 1.14 ratio      PTT - ( 07 Mar 2021 13:41 )  PTT:35.3 sec

## 2021-03-08 ENCOUNTER — APPOINTMENT (OUTPATIENT)
Dept: OTOLARYNGOLOGY | Facility: CLINIC | Age: 66
End: 2021-03-08
Payer: COMMERCIAL

## 2021-03-08 ENCOUNTER — NON-APPOINTMENT (OUTPATIENT)
Age: 66
End: 2021-03-08

## 2021-03-08 DIAGNOSIS — K27.9 PEPTIC ULCER, SITE UNSPECIFIED, UNSPECIFIED AS ACUTE OR CHRONIC, W/OUT HEMORRHAGE OR PERFORATION: ICD-10-CM

## 2021-03-08 DIAGNOSIS — D64.9 ANEMIA, UNSPECIFIED: ICD-10-CM

## 2021-03-08 DIAGNOSIS — Z78.9 OTHER SPECIFIED HEALTH STATUS: ICD-10-CM

## 2021-03-08 DIAGNOSIS — Q24.9 CONGENITAL MALFORMATION OF HEART, UNSPECIFIED: ICD-10-CM

## 2021-03-08 PROBLEM — Z00.00 ENCOUNTER FOR PREVENTIVE HEALTH EXAMINATION: Status: ACTIVE | Noted: 2021-03-08

## 2021-03-08 PROCEDURE — 99072 ADDL SUPL MATRL&STAF TM PHE: CPT

## 2021-03-08 PROCEDURE — 99203 OFFICE O/P NEW LOW 30 MIN: CPT

## 2021-03-08 NOTE — CONSULT LETTER
[Dear  ___] : Dear  [unfilled], [Consult Letter:] : I had the pleasure of evaluating your patient, [unfilled]. [Please see my note below.] : Please see my note below. [Consult Closing:] : Thank you very much for allowing me to participate in the care of this patient.  If you have any questions, please do not hesitate to contact me. [Sincerely,] : Sincerely, [FreeTextEntry2] : Roberto Driver MD [FreeTextEntry3] : Airam Mckeon MD\par Otolaryngology - Head & Neck Surgery\par

## 2021-03-08 NOTE — PHYSICAL EXAM
[de-identified] : bilateral nasal packs, merocels, no active bleeding [Midline] : trachea located in midline position [de-identified] : no active bleeding on posterior pharyngeal wall [Normal] : no rashes

## 2021-03-08 NOTE — HISTORY OF PRESENT ILLNESS
[de-identified] : Patient presents today due to epistaxis. Patient states bleeding started yesterday morning from she suspects bilateral  nostrils. Patient states bleeding wouldn’t stop so she went to urgent care, yesterday, bilateral packs placed, she continued to bleed, so they replaced them then sent her to the ED. In ED, blood work obtained, packs were not removed.  Patient admits BP is controlled, is on Losartan. No blood thinners. She admits h/o minor epistaxis, doesn't recall what side. She has had a few minor nose bleeds in the last few weeks. No reported bleeding problems during previous surgeries. No recent nasal trauma. 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 Does not usually nose sprays. Is on augmentin.

## 2021-03-08 NOTE — ASSESSMENT
[FreeTextEntry1] : - patient with nasal packs in place for only 24 hours at this point. Recommend another 24 hours of nasal packs in place\par - f/up tomorrow for eval for nasal pack removal\par - continue augmentin

## 2021-03-09 ENCOUNTER — APPOINTMENT (OUTPATIENT)
Dept: OTOLARYNGOLOGY | Facility: CLINIC | Age: 66
End: 2021-03-09
Payer: COMMERCIAL

## 2021-03-09 DIAGNOSIS — J34.89 OTHER SPECIFIED DISORDERS OF NOSE AND NASAL SINUSES: ICD-10-CM

## 2021-03-09 PROCEDURE — 99213 OFFICE O/P EST LOW 20 MIN: CPT | Mod: 25

## 2021-03-09 PROCEDURE — 99072 ADDL SUPL MATRL&STAF TM PHE: CPT

## 2021-03-09 PROCEDURE — 31238 NSL/SINS NDSC SRG NSL HEMRRG: CPT

## 2021-03-09 NOTE — HISTORY OF PRESENT ILLNESS
[de-identified] : Patient presents today due to epistaxis. Patient states bleeding started yesterday morning from she suspects bilateral  nostrils. Patient states bleeding wouldn’t stop so she went to urgent care, yesterday, bilateral packs placed, she continued to bleed, so they replaced them then sent her to the ED. In ED, blood work obtained, packs were not removed.  Patient admits BP is controlled, is on Losartan. No blood thinners. She admits h/o minor epistaxis, doesn't recall what side. She has had a few minor nose bleeds in the last few weeks. No reported bleeding problems during previous surgeries. No recent nasal trauma. 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 Does not usually nose sprays. Is on augmentin.  [FreeTextEntry1] : 3/9/21: Patient presents today following up on epistaxis. Patient denies any new changes since her last visit yesterday.

## 2021-03-09 NOTE — ASSESSMENT
[FreeTextEntry1] : packings removed.\par Cauterized and dressed.\par \par Note from DR Mckeon reviewed (3/8).\par Note from [facility] reviewed (3/7). \par \par I discussed with the patient the pathophysiology of anterior epistaxis. I showed on a drawing the location of the anterior vascular area. I explained the risk factors including but not limited to nasal dryness, and recommended nasal moisturizing and avoiding any intranasal trauma. I also demonstrated how to stop a bleeding if it happens again.\par \par RTC in 3 days.

## 2021-03-12 ENCOUNTER — APPOINTMENT (OUTPATIENT)
Dept: OTOLARYNGOLOGY | Facility: CLINIC | Age: 66
End: 2021-03-12
Payer: COMMERCIAL

## 2021-03-12 DIAGNOSIS — R04.0 EPISTAXIS: ICD-10-CM

## 2021-03-12 PROCEDURE — 99212 OFFICE O/P EST SF 10 MIN: CPT | Mod: 25

## 2021-03-12 PROCEDURE — 99072 ADDL SUPL MATRL&STAF TM PHE: CPT

## 2021-03-12 PROCEDURE — 31231 NASAL ENDOSCOPY DX: CPT

## 2021-03-12 NOTE — HISTORY OF PRESENT ILLNESS
[de-identified] : Patient presents today due to epistaxis. Patient states bleeding started yesterday morning from she suspects bilateral  nostrils. Patient states bleeding wouldn’t stop so she went to urgent care, yesterday, bilateral packs placed, she continued to bleed, so they replaced them then sent her to the ED. In ED, blood work obtained, packs were not removed.  Patient admits BP is controlled, is on Losartan. No blood thinners. She admits h/o minor epistaxis, doesn't recall what side. She has had a few minor nose bleeds in the last few weeks. No reported bleeding problems during previous surgeries. No recent nasal trauma. 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 Does not usually nose sprays. Is on augmentin. \par \par 3/9/21: Patient presents today following up on epistaxis. Patient denies any new changes since her last visit yesterday.  [FreeTextEntry1] : 3/12//21: Patient presents today following up on epistaxis.   Denies any recent bleeding

## 2021-04-16 ENCOUNTER — APPOINTMENT (OUTPATIENT)
Dept: OTOLARYNGOLOGY | Facility: CLINIC | Age: 66
End: 2021-04-16

## 2021-12-25 ENCOUNTER — EMERGENCY (EMERGENCY)
Facility: HOSPITAL | Age: 66
LOS: 0 days | Discharge: HOME | End: 2021-12-25
Attending: EMERGENCY MEDICINE | Admitting: EMERGENCY MEDICINE
Payer: MEDICARE

## 2021-12-25 VITALS
HEIGHT: 62 IN | SYSTOLIC BLOOD PRESSURE: 141 MMHG | WEIGHT: 177.91 LBS | OXYGEN SATURATION: 99 % | RESPIRATION RATE: 18 BRPM | HEART RATE: 101 BPM | TEMPERATURE: 101 F | DIASTOLIC BLOOD PRESSURE: 74 MMHG

## 2021-12-25 VITALS
SYSTOLIC BLOOD PRESSURE: 129 MMHG | OXYGEN SATURATION: 100 % | HEART RATE: 86 BPM | DIASTOLIC BLOOD PRESSURE: 62 MMHG | RESPIRATION RATE: 18 BRPM | TEMPERATURE: 101 F

## 2021-12-25 DIAGNOSIS — R63.0 ANOREXIA: ICD-10-CM

## 2021-12-25 DIAGNOSIS — E11.9 TYPE 2 DIABETES MELLITUS WITHOUT COMPLICATIONS: ICD-10-CM

## 2021-12-25 DIAGNOSIS — R50.9 FEVER, UNSPECIFIED: ICD-10-CM

## 2021-12-25 DIAGNOSIS — R53.1 WEAKNESS: ICD-10-CM

## 2021-12-25 DIAGNOSIS — I10 ESSENTIAL (PRIMARY) HYPERTENSION: ICD-10-CM

## 2021-12-25 DIAGNOSIS — M79.10 MYALGIA, UNSPECIFIED SITE: ICD-10-CM

## 2021-12-25 DIAGNOSIS — E03.9 HYPOTHYROIDISM, UNSPECIFIED: ICD-10-CM

## 2021-12-25 DIAGNOSIS — Z88.8 ALLERGY STATUS TO OTHER DRUGS, MEDICAMENTS AND BIOLOGICAL SUBSTANCES: ICD-10-CM

## 2021-12-25 DIAGNOSIS — Z20.822 CONTACT WITH AND (SUSPECTED) EXPOSURE TO COVID-19: ICD-10-CM

## 2021-12-25 LAB
ALBUMIN SERPL ELPH-MCNC: 4 G/DL — SIGNIFICANT CHANGE UP (ref 3.5–5.2)
ALP SERPL-CCNC: 142 U/L — HIGH (ref 30–115)
ALT FLD-CCNC: 30 U/L — SIGNIFICANT CHANGE UP (ref 0–41)
ANION GAP SERPL CALC-SCNC: 16 MMOL/L — HIGH (ref 7–14)
APPEARANCE UR: CLEAR — SIGNIFICANT CHANGE UP
AST SERPL-CCNC: 23 U/L — SIGNIFICANT CHANGE UP (ref 0–41)
BASOPHILS # BLD AUTO: 0.04 K/UL — SIGNIFICANT CHANGE UP (ref 0–0.2)
BASOPHILS NFR BLD AUTO: 0.6 % — SIGNIFICANT CHANGE UP (ref 0–1)
BILIRUB SERPL-MCNC: 0.3 MG/DL — SIGNIFICANT CHANGE UP (ref 0.2–1.2)
BILIRUB UR-MCNC: NEGATIVE — SIGNIFICANT CHANGE UP
BUN SERPL-MCNC: 9 MG/DL — LOW (ref 10–20)
CALCIUM SERPL-MCNC: 9.1 MG/DL — SIGNIFICANT CHANGE UP (ref 8.5–10.1)
CHLORIDE SERPL-SCNC: 100 MMOL/L — SIGNIFICANT CHANGE UP (ref 98–110)
CO2 SERPL-SCNC: 21 MMOL/L — SIGNIFICANT CHANGE UP (ref 17–32)
COLOR SPEC: YELLOW — SIGNIFICANT CHANGE UP
CREAT SERPL-MCNC: 0.6 MG/DL — LOW (ref 0.7–1.5)
DIFF PNL FLD: NEGATIVE — SIGNIFICANT CHANGE UP
EOSINOPHIL # BLD AUTO: 0.02 K/UL — SIGNIFICANT CHANGE UP (ref 0–0.7)
EOSINOPHIL NFR BLD AUTO: 0.3 % — SIGNIFICANT CHANGE UP (ref 0–8)
GLUCOSE SERPL-MCNC: 137 MG/DL — HIGH (ref 70–99)
GLUCOSE UR QL: NEGATIVE — SIGNIFICANT CHANGE UP
HCT VFR BLD CALC: 39.9 % — SIGNIFICANT CHANGE UP (ref 37–47)
HGB BLD-MCNC: 13.2 G/DL — SIGNIFICANT CHANGE UP (ref 12–16)
IMM GRANULOCYTES NFR BLD AUTO: 0.2 % — SIGNIFICANT CHANGE UP (ref 0.1–0.3)
KETONES UR-MCNC: ABNORMAL
LEUKOCYTE ESTERASE UR-ACNC: NEGATIVE — SIGNIFICANT CHANGE UP
LYMPHOCYTES # BLD AUTO: 0.49 K/UL — LOW (ref 1.2–3.4)
LYMPHOCYTES # BLD AUTO: 7.7 % — LOW (ref 20.5–51.1)
MCHC RBC-ENTMCNC: 27.2 PG — SIGNIFICANT CHANGE UP (ref 27–31)
MCHC RBC-ENTMCNC: 33.1 G/DL — SIGNIFICANT CHANGE UP (ref 32–37)
MCV RBC AUTO: 82.3 FL — SIGNIFICANT CHANGE UP (ref 81–99)
MONOCYTES # BLD AUTO: 0.91 K/UL — HIGH (ref 0.1–0.6)
MONOCYTES NFR BLD AUTO: 14.4 % — HIGH (ref 1.7–9.3)
NEUTROPHILS # BLD AUTO: 4.87 K/UL — SIGNIFICANT CHANGE UP (ref 1.4–6.5)
NEUTROPHILS NFR BLD AUTO: 76.8 % — HIGH (ref 42.2–75.2)
NITRITE UR-MCNC: NEGATIVE — SIGNIFICANT CHANGE UP
NRBC # BLD: 0 /100 WBCS — SIGNIFICANT CHANGE UP (ref 0–0)
PH UR: 6.5 — SIGNIFICANT CHANGE UP (ref 5–8)
PLATELET # BLD AUTO: 189 K/UL — SIGNIFICANT CHANGE UP (ref 130–400)
POTASSIUM SERPL-MCNC: 4.1 MMOL/L — SIGNIFICANT CHANGE UP (ref 3.5–5)
POTASSIUM SERPL-SCNC: 4.1 MMOL/L — SIGNIFICANT CHANGE UP (ref 3.5–5)
PROT SERPL-MCNC: 6.5 G/DL — SIGNIFICANT CHANGE UP (ref 6–8)
PROT UR-MCNC: SIGNIFICANT CHANGE UP
RBC # BLD: 4.85 M/UL — SIGNIFICANT CHANGE UP (ref 4.2–5.4)
RBC # FLD: 13.4 % — SIGNIFICANT CHANGE UP (ref 11.5–14.5)
SARS-COV-2 RNA SPEC QL NAA+PROBE: SIGNIFICANT CHANGE UP
SODIUM SERPL-SCNC: 137 MMOL/L — SIGNIFICANT CHANGE UP (ref 135–146)
SP GR SPEC: 1.02 — SIGNIFICANT CHANGE UP (ref 1.01–1.03)
UROBILINOGEN FLD QL: SIGNIFICANT CHANGE UP
WBC # BLD: 6.34 K/UL — SIGNIFICANT CHANGE UP (ref 4.8–10.8)
WBC # FLD AUTO: 6.34 K/UL — SIGNIFICANT CHANGE UP (ref 4.8–10.8)

## 2021-12-25 PROCEDURE — 71045 X-RAY EXAM CHEST 1 VIEW: CPT | Mod: 26

## 2021-12-25 PROCEDURE — 99284 EMERGENCY DEPT VISIT MOD MDM: CPT

## 2021-12-25 RX ORDER — ACETAMINOPHEN 500 MG
650 TABLET ORAL ONCE
Refills: 0 | Status: COMPLETED | OUTPATIENT
Start: 2021-12-25 | End: 2021-12-25

## 2021-12-25 RX ORDER — SODIUM CHLORIDE 9 MG/ML
2000 INJECTION, SOLUTION INTRAVENOUS ONCE
Refills: 0 | Status: COMPLETED | OUTPATIENT
Start: 2021-12-25 | End: 2021-12-25

## 2021-12-25 RX ORDER — ONDANSETRON 8 MG/1
4 TABLET, FILM COATED ORAL ONCE
Refills: 0 | Status: COMPLETED | OUTPATIENT
Start: 2021-12-25 | End: 2021-12-25

## 2021-12-25 RX ADMIN — SODIUM CHLORIDE 2000 MILLILITER(S): 9 INJECTION, SOLUTION INTRAVENOUS at 13:48

## 2021-12-25 RX ADMIN — ONDANSETRON 4 MILLIGRAM(S): 8 TABLET, FILM COATED ORAL at 13:48

## 2021-12-25 NOTE — ED PROVIDER NOTE - CHIEF COMPLAINT
The patient is a 66y Female complaining of abdominal pain. The patient is a 66y Female complaining of flu-like symptoms.

## 2021-12-25 NOTE — ED PROVIDER NOTE - PHYSICAL EXAMINATION
CONSTITUTIONAL: Well-developed; well-nourished; in no acute distress.   SKIN: warm, dry  HEAD: Normocephalic; atraumatic.  EYES: no conjunctival injection. PERRL.   ENT: No nasal discharge; airway clear.  NECK: Supple; non tender.  CARD: +Mildy tachycardic  RESP: No wheezes, rales or rhonchi.  ABD: soft ntnd  EXT: Normal ROM.  No clubbing, cyanosis or edema.   LYMPH: No acute cervical adenopathy.  NEURO: Alert, oriented, grossly unremarkable  PSYCH: Cooperative, appropriate.

## 2021-12-25 NOTE — ED PROVIDER NOTE - PATIENT PORTAL LINK FT
You can access the FollowMyHealth Patient Portal offered by [facility] by registering at the following website: [email or web address]. By joining [employer]’s FollowMyHealth portal, you will also be able to view your health information using other applications (apps) compatible with our system.

## 2021-12-25 NOTE — ED PROVIDER NOTE - ATTENDING CONTRIBUTION TO CARE
66 year old female, pmhx as documented presenting with body aches, decreased PO intake and worsening generalized weakness associated with subjective fevers x several days. Body aches are diffuse, achy, non-radiating, no palliative or provocative factors, mild severity. Otherwise denies vomiting, diarrhea, blood in stool, urinary symptoms or leg swelling. Also denies recent travel or sick contacts.    Vital Signs: I have reviewed the initial vital signs.  Constitutional: NAD, well-nourished, appears stated age, no acute distress.  HEENT: Airway patent, moist MM, no erythema/swelling/deformity of oral structures. EOMI, PERRLA.  CV: regular rate, regular rhythm, well-perfused extremities, 2+ b/l DP and radial pulses equal.  Lungs: BCTA, no increased WOB.  ABD: NTND, no guarding or rebound, no pulsatile mass, no hernias.   MSK: Neck supple, nontender, nl ROM, no stepoff. Chest nontender. Back nontender in TLS spine or to b/l bony structures or flanks. Ext nontender, nl rom, no deformity.   INTEG: Skin warm, dry, no rash.  NEURO: A&Ox3, normal strength, nl sensation throughout, normal speech.   PSYCH: Calm, cooperative, normal affect and interaction.    Overall well appearing. Will obtain labs, CXR, give IVF, symptomatic control, re-eval.

## 2021-12-25 NOTE — ED PROVIDER NOTE - CLINICAL SUMMARY MEDICAL DECISION MAKING FREE TEXT BOX
Patient presented with several days of fevers, generalized weakness, body aches. Otherwise HD stable, well appearing, no acute respiratory distress on RA. Obtained labs which were grossly unremarkable including no significant leukocytosis, anemia, signs of dehydration/ELLIOT, transaminitis or significant electrolyte abnormalities. CXR grossly negative and UA negative for infection. Patient remained stable on RA, and able to ambulate without difficulty or desaturation and felt better after tx in ED. Likely viral etiology. Given the above, will discharge home to quarantine. Patient agreeable with plan. Agrees to return to ED immediately for any new or worsening symptoms.

## 2021-12-25 NOTE — ED ADULT NURSE NOTE - NSIMPLEMENTINTERV_GEN_ALL_ED
Implemented All Universal Safety Interventions:  [place] to call system. Call bell, personal items and telephone within reach. Instruct patient to call for assistance. Room bathroom lighting operational. Non-slip footwear when patient is off stretcher. Physically safe environment: no spills, clutter or unnecessary equipment. Stretcher in lowest position, wheels locked, appropriate side rails in place.

## 2021-12-25 NOTE — ED PROVIDER NOTE - CARE PROVIDER_API CALL
Roberto Driver)  [address] [facility]  [address] [place] [address]  Phone: [phone]  Fax: [phone]  Follow Up Time:

## 2021-12-25 NOTE — ED PROVIDER NOTE - OBJECTIVE STATEMENT
66y F T2D, HTN, hypothyroidism presenting with body aches, decreased PO intake, and weakness xfew days. +fevers. No chest pain/sob. No abdominal pain. Vaccinated and boosted for covid. +nausea

## 2021-12-25 NOTE — ED PROVIDER NOTE - CARE PLAN
1 Principal Discharge DX:	Fever   Principal Discharge DX:	Fever  Secondary Diagnosis:	Generalized weakness

## 2021-12-25 NOTE — ED PROVIDER NOTE - NS ED ROS FT
Eyes:  No visual changes, eye pain or discharge.  ENMT:  No hearing changes, pain, no sore throat or runny nose, no difficulty swallowing  Cardiac:  No chest pain, SOB or edema. No chest pain with exertion.  Respiratory:  No cough or respiratory distress. No hemoptysis. No history of asthma or RAD.  GI:  see HPI  :  No dysuria, frequency or burning.  MS:  see HPI  Neuro:  No headache or weakness.  No LOC.  Skin:  No skin rash.   Endocrine: see HPI

## 2021-12-27 LAB
CULTURE RESULTS: SIGNIFICANT CHANGE UP
SPECIMEN SOURCE: SIGNIFICANT CHANGE UP

## 2022-01-21 ENCOUNTER — TRANSCRIPTION ENCOUNTER (OUTPATIENT)
Age: 67
End: 2022-01-21

## 2022-05-16 ENCOUNTER — NON-APPOINTMENT (OUTPATIENT)
Age: 67
End: 2022-05-16

## 2022-11-02 ENCOUNTER — APPOINTMENT (OUTPATIENT)
Dept: CARDIOLOGY | Facility: CLINIC | Age: 67
End: 2022-11-02
Payer: MEDICARE

## 2022-11-02 VITALS — SYSTOLIC BLOOD PRESSURE: 144 MMHG | DIASTOLIC BLOOD PRESSURE: 81 MMHG

## 2022-11-02 VITALS — HEART RATE: 68 BPM

## 2022-11-02 VITALS
TEMPERATURE: 96.7 F | WEIGHT: 187 LBS | HEART RATE: 70 BPM | BODY MASS INDEX: 33.13 KG/M2 | OXYGEN SATURATION: 100 % | HEIGHT: 63 IN

## 2022-11-02 DIAGNOSIS — E11.9 TYPE 2 DIABETES MELLITUS W/OUT COMPLICATIONS: ICD-10-CM

## 2022-11-02 PROCEDURE — 99204 OFFICE O/P NEW MOD 45 MIN: CPT | Mod: 25

## 2022-11-02 PROCEDURE — 93000 ELECTROCARDIOGRAM COMPLETE: CPT

## 2022-11-02 RX ORDER — INSULIN LISPRO 100 [IU]/ML
100 INJECTION, SOLUTION INTRAVENOUS; SUBCUTANEOUS
Qty: 30 | Refills: 0 | Status: ACTIVE | COMMUNITY
Start: 2022-04-19

## 2022-11-02 RX ORDER — LEVOTHYROXINE SODIUM 25 MCG
25 TABLET ORAL DAILY
Refills: 0 | Status: ACTIVE | COMMUNITY

## 2022-11-02 RX ORDER — INSULIN LISPRO 100 [IU]/ML
100 INJECTION, SOLUTION INTRAVENOUS; SUBCUTANEOUS
Qty: 3 | Refills: 0 | Status: ACTIVE | COMMUNITY
Start: 2022-06-27

## 2022-11-02 RX ORDER — ROSUVASTATIN CALCIUM 10 MG/1
10 TABLET, FILM COATED ORAL
Qty: 30 | Refills: 3 | Status: ACTIVE | COMMUNITY
Start: 2022-09-12

## 2022-11-02 RX ORDER — LOSARTAN POTASSIUM 100 MG/1
TABLET, FILM COATED ORAL DAILY
Refills: 0 | Status: ACTIVE | COMMUNITY

## 2022-11-02 RX ORDER — DULOXETINE HYDROCHLORIDE 30 MG/1
30 CAPSULE, DELAYED RELEASE PELLETS ORAL
Qty: 90 | Refills: 0 | Status: ACTIVE | COMMUNITY
Start: 2022-09-21

## 2022-11-02 RX ORDER — LEVOTHYROXINE SODIUM 0.17 MG/1
TABLET ORAL
Refills: 0 | Status: DISCONTINUED | COMMUNITY
End: 2022-11-02

## 2022-11-02 RX ORDER — MELOXICAM 7.5 MG/1
7.5 TABLET ORAL DAILY
Refills: 0 | Status: ACTIVE | COMMUNITY

## 2022-11-02 RX ORDER — MELOXICAM 15 MG/1
TABLET ORAL
Refills: 0 | Status: DISCONTINUED | COMMUNITY
End: 2022-11-02

## 2022-11-02 RX ORDER — PREGABALIN 300 MG/1
CAPSULE ORAL
Refills: 0 | Status: DISCONTINUED | COMMUNITY
End: 2022-11-02

## 2022-11-02 NOTE — ASSESSMENT
[FreeTextEntry1] : 67 year old woman with HTN and DM presents to establish care. \par \par 1. Palpitations: her symptoms happen about three times a week. \par - Will do a 14 day MCOT to rule out arrhythmias\par \par 2. Chest discomfort: Mostly happens while sitting. Will do an echocardiogram to rule out structural abnormalities. May consider stress testing due to her risk factors after echocardiogram results. \par \par 3. HTN: BP above goal today. Her goal is <130/80 mmhg. \par - She will keep a BP log and bring it to next visit\par \par 4. DM: she should be on a moderate intensity statin. Will increase rosuvastatin to 10mg daily.\par \par RTC in 4 weeks.

## 2022-11-02 NOTE — HISTORY OF PRESENT ILLNESS
[FreeTextEntry1] : RADHA ZAPATA is a 67 year year old woman with DM and HTN who presents to establish care. \par \par For the past three weeks, Radha has been having palpitations. There is no pattern to them, they come and go. 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 She is also having chest discomfort. It is not a pain or a tightness but it is a discomfort. It could happen at rest and with exertion. It is central and right sided. It is not associated with shortness of breath or diaphoresis. She has dizziness and balance issues for which she sees neurology. She has leg swelling and wears compression stockings. She denies orthopnea and PND. \par

## 2022-11-02 NOTE — REVIEW OF SYSTEMS
[SOB] : no shortness of breath [Chest Discomfort] : chest discomfort [Lower Ext Edema] : lower extremity edema [Palpitations] : palpitations [Dizziness] : dizziness [Numbness (Hypoesthesia)] : numbness [Weakness] : weakness [Limb Weakness (Paresis)] : limb weakness (Paresis) [Negative] : Heme/Lymph

## 2022-11-25 ENCOUNTER — APPOINTMENT (OUTPATIENT)
Dept: CARDIOLOGY | Facility: CLINIC | Age: 67
End: 2022-11-25
Payer: MEDICARE

## 2022-11-25 PROCEDURE — 93228 REMOTE 30 DAY ECG REV/REPORT: CPT

## 2022-12-15 ENCOUNTER — APPOINTMENT (OUTPATIENT)
Dept: CARDIOLOGY | Facility: CLINIC | Age: 67
End: 2022-12-15
Payer: MEDICARE

## 2022-12-15 VITALS
HEART RATE: 70 BPM | BODY MASS INDEX: 32.6 KG/M2 | RESPIRATION RATE: 16 BRPM | HEIGHT: 63 IN | TEMPERATURE: 97.1 F | OXYGEN SATURATION: 97 % | WEIGHT: 184 LBS

## 2022-12-15 VITALS — SYSTOLIC BLOOD PRESSURE: 136 MMHG | DIASTOLIC BLOOD PRESSURE: 79 MMHG

## 2022-12-15 DIAGNOSIS — R07.89 OTHER CHEST PAIN: ICD-10-CM

## 2022-12-15 DIAGNOSIS — I10 ESSENTIAL (PRIMARY) HYPERTENSION: ICD-10-CM

## 2022-12-15 DIAGNOSIS — R00.2 PALPITATIONS: ICD-10-CM

## 2022-12-15 PROCEDURE — 93306 TTE W/DOPPLER COMPLETE: CPT

## 2022-12-15 PROCEDURE — 99214 OFFICE O/P EST MOD 30 MIN: CPT

## 2022-12-15 NOTE — ASSESSMENT
[FreeTextEntry1] : 67 year old woman with HTN and DM presents for follow up today. \par \par 1. Palpitations: MCOT didn't show any sustained arrhythmias. Offered her a beta blocker for symptomatic use, but she says her symptoms are manageable and she would like to defer for now. \par \par 2. Chest discomfort: Echocardiogram showed normal LV size and function and no significant valve disease. She is still having chest pain. Will do a nuclear stress test for ischemia evaluation. \par \par 3. HTN: BP above goal today. Her goal is <130/80 mmhg. \par - She will keep a BP log and bring it to next visit\par \par 4. DM: she should be on a moderate intensity statin. Continue rosuvastatin to 10mg daily.\par \par RTC in 4 weeks.

## 2022-12-15 NOTE — HISTORY OF PRESENT ILLNESS
[FreeTextEntry1] : 67 year year old woman with DM and HTN who presents for follow up today. \par \par She initially came in for palpitations and chest discomfort. In terms of the chest discomfort, it could happen at rest and with exertion. It is central and right sided. It is not associated with shortness of breath or diaphoresis. She has dizziness and balance issues for which she sees neurology. She has leg swelling and wears compression stockings. She denies orthopnea and PND. The palpitations are stable.

## 2022-12-15 NOTE — REVIEW OF SYSTEMS
[Chest Discomfort] : chest discomfort [Lower Ext Edema] : lower extremity edema [Palpitations] : palpitations [Negative] : Heme/Lymph

## 2022-12-15 NOTE — CARDIOLOGY SUMMARY
[de-identified] : 11/2/22: normal sinus rhythm  [de-identified] : MCOT: 14 days: <1% PAC, <1% PVC [de-identified] : 12/15/22: Normal left and right ventricular size and systolic function. No significant valve disease. G1DD.

## 2023-01-26 ENCOUNTER — APPOINTMENT (OUTPATIENT)
Dept: CARDIOLOGY | Facility: CLINIC | Age: 68
End: 2023-01-26

## 2023-02-28 ENCOUNTER — EMERGENCY (EMERGENCY)
Facility: HOSPITAL | Age: 68
LOS: 0 days | Discharge: HOME | End: 2023-02-28
Attending: EMERGENCY MEDICINE
Payer: MEDICARE

## 2023-02-28 VITALS
HEIGHT: 63 IN | OXYGEN SATURATION: 99 % | RESPIRATION RATE: 18 BRPM | DIASTOLIC BLOOD PRESSURE: 90 MMHG | HEART RATE: 107 BPM | WEIGHT: 179.9 LBS | SYSTOLIC BLOOD PRESSURE: 216 MMHG

## 2023-02-28 VITALS
TEMPERATURE: 98 F | SYSTOLIC BLOOD PRESSURE: 140 MMHG | HEART RATE: 99 BPM | RESPIRATION RATE: 19 BRPM | OXYGEN SATURATION: 99 % | DIASTOLIC BLOOD PRESSURE: 81 MMHG

## 2023-02-28 DIAGNOSIS — Z88.5 ALLERGY STATUS TO NARCOTIC AGENT: ICD-10-CM

## 2023-02-28 DIAGNOSIS — R04.0 EPISTAXIS: ICD-10-CM

## 2023-02-28 DIAGNOSIS — I10 ESSENTIAL (PRIMARY) HYPERTENSION: ICD-10-CM

## 2023-02-28 DIAGNOSIS — E11.9 TYPE 2 DIABETES MELLITUS WITHOUT COMPLICATIONS: ICD-10-CM

## 2023-02-28 DIAGNOSIS — E03.9 HYPOTHYROIDISM, UNSPECIFIED: ICD-10-CM

## 2023-02-28 PROCEDURE — 30901 CONTROL OF NOSEBLEED: CPT | Mod: RT

## 2023-02-28 PROCEDURE — 99284 EMERGENCY DEPT VISIT MOD MDM: CPT | Mod: 25

## 2023-02-28 PROCEDURE — 99282 EMERGENCY DEPT VISIT SF MDM: CPT

## 2023-02-28 PROCEDURE — 30903 CONTROL OF NOSEBLEED: CPT

## 2023-02-28 NOTE — ED PROVIDER NOTE - NSFOLLOWUPINSTRUCTIONS_ED_ALL_ED_FT
Please make sure to follow up with your primary care doctor in 3 days.    Please make sure to return to this emergency department in 48 hours for reevaluation.     Our Emergency Department Referral Coordinators will be reaching out ot you in the next 24-48 hours from 9:00am to 5:00pm (Monday to Friday) with a follow up appointment. Please expect a phone call from the hospital in that time frame. If you do not receive a call or if you have any questions or concerns, you can reach them at [phone] or [phone].      Nosebleed    WHAT YOU NEED TO KNOW:    A nosebleed, or epistaxis, occurs when one or more of the blood vessels in your nose break. You may have dark or bright red blood from one or both nostrils. A nosebleed is most commonly caused by dry air or picking your nose. A direct blow to your nose, irritation from a cold or allergies, or a foreign object can also cause a nosebleed.     DISCHARGE INSTRUCTIONS:    Return to the emergency department if:     Your nasal packing is soaked with blood.      Your nose is still bleeding after 20 minutes, even after you pinch it.       You have a foul-smelling discharge coming out of your nose.      You feel so weak and dizzy that you have trouble standing up.      You have trouble breathing or talking.     Contact your healthcare provider if:     You have a fever and are vomiting.      You have pain in and around your nose that is getting worse even after you take pain medicines.      Your nasal pack is loose.      You have questions or concerns about your condition or care.    First aid:     Sit up and lean forward. This will help prevent you from swallowing blood. Spit blood and saliva into a bowl.       Apply pressure to your nose. Use 2 fingers to pinch your nose shut for 10 to 15 minutes. This will help stop the bleeding. Breathe through your mouth.            Apply ice on the bridge of your nose to decrease swelling and bleeding. Use a cold pack or put crushed ice in a plastic bag. Cover it with a towel to protect your skin.      Pack your nose with a cotton ball, tissue, tampon, or gauze bandage to stop the bleeding.    Medicines:     Medicines applied to a small piece of cotton and placed in your nose. Medicine may also be sprayed in or applied directly to your nose. You may need medicine to prevent an infection. If bleeding is severe, medicine may be injected into a blood vessel in your nose.       Take your medicine as directed. Contact your healthcare provider if you think your medicine is not helping or if you have side effects. Tell him of her if you are allergic to any medicine. Keep a list of the medicines, vitamins, and herbs you take. Include the amounts, and when and why you take them. Bring the list or the pill bottles to follow-up visits. Carry your medicine list with you in case of an emergency.    Prevent another nosebleed:     Keep your nose moist. Put a small amount of petroleum jelly inside your nostrils as needed. Use a saline (saltwater) nasal spray. Do not put anything else inside your nose unless your healthcare provider says it is okay. Do not use oil-based lubricants if you use oxygen therapy. They may be flammable.      Use a cool mist humidifier to increase air moisture in your home. This will help your nose stay moist.       Do not pick or blow your nose for at least a week. You can irritate or damage your nose if you pick it. Blowing your nose too hard may cause the bleeding to start again. Do not bend over or strain as this can cause the bleeding to start again.      Avoid irritants such as tobacco smoke or chemical sprays such as .    Follow up with your healthcare provider as directed: Any packing in your nose should be removed within 2 to 3 days. Write down your questions so you remember to ask them during your visits.        © Copyright [employer] 2019 All illustrations and images included in CareNotes are the copyrighted property of [employer] Inc[employer] or [employer].

## 2023-02-28 NOTE — ED PROVIDER NOTE - OBJECTIVE STATEMENT
67-year-old female with a past medical history of diabetes, hypertension, and hypothyroidism who presents with right-sided nosebleed.  Reports that symptoms started 1 hour prior to arrival; reports that bleeding started when she was at home.  Denies recent trauma or injury to her nose.  Reports that she had epistaxis 2 years ago that required packing.  Denies fever, lightheadedness, dizziness, shortness breath, chest pain, and bleeding from elsewhere.  Denies anticoagulant use.

## 2023-02-28 NOTE — ED PROVIDER NOTE - CLINICAL SUMMARY MEDICAL DECISION MAKING FREE TEXT BOX
67-year-old female with a past medical history of diabetes, hypertension, hypothyroidism presents with right-sided epistaxis.  Has had epistaxis in the past and required packing and also states she has had no chronic septal perforation.  States that the symptoms started 1 to 2 hours prior to arrival.  Denies shortness of breath chest pain, severe headache, or dizziness.  Denies anticoagulation use.  On exam hypertensive, vital signs stable otherwise, normal work of breathing, airway intact, does have slow oozing from the right nare and blood in the nasal passages bilaterally.  No pulsatile bleeding from the nares or in her oropharynx.  Attempted to remove clots and find source but no obvious anterior area to cauterize.  Bleeding did not improve with direct pressure by the patient.  Placed Rhino Rocket with hemostasis.  Vital signs improved.  Advised to follow-up with ENT

## 2023-02-28 NOTE — ED PROVIDER NOTE - PATIENT PORTAL LINK FT
You can access the FollowMyHealth Patient Portal offered by [facility] by registering at the following website: [email or web address]. By joining [employer]’s FollowMyHealth portal, you will also be able to view your health information using other applications (apps) compatible with our system.

## 2023-02-28 NOTE — ED PROVIDER NOTE - PHYSICAL EXAMINATION
CONSTITUTIONAL: in no apparent distress.   EYES: Pupils are round and reactive, extra-ocular muscles are intact. Eyelids are normal in appearance without swelling or lesions.   ENT: Hearing is intact with good acuity to spoken voice.  Active nose bleeding noticed in the R nostril and unable to identify the source. Patient is speaking clearly, not muffled and airway is intact.  RESPIRATORY: No signs of respiratory distress. Lung sounds are clear in all lobes bilaterally without rales, rhonchi, or wheezes.  CARDIOVASCULAR: Regular rate and rhythm.   NEURO: A & O x 3. Normal speech. No focal deficit.  PSYCHOLOGICAL: Appropriate mood and affect. Good judgement and insight.

## 2023-02-28 NOTE — ED PROVIDER NOTE - DIFFERENTIAL DIAGNOSIS
Differential Diagnosis epistaxis from anterior bleed, clincially not concerned for posterior bleeding especially after improvement post anterior rhinorocket

## 2023-02-28 NOTE — ED PROVIDER NOTE - PROGRESS NOTE DETAILS
Physical exam shows R epistaxis. Rhinorocket applied and bleeding stopped after observing for 1 hr. Pt is stable for discharge. Pt has been instructed to return to this ED in 48 hours for reevaluation and removal of rhinorocket. Pt is stable for discharge.

## 2023-02-28 NOTE — ED PROVIDER NOTE - NS ED ATTENDING STATEMENT MOD
This was a shared visit with the MUKUND. I reviewed and verified the documentation and independently performed the documented:

## 2023-03-02 ENCOUNTER — EMERGENCY (EMERGENCY)
Facility: HOSPITAL | Age: 68
LOS: 0 days | Discharge: HOME | End: 2023-03-02
Attending: EMERGENCY MEDICINE
Payer: MEDICARE

## 2023-03-02 VITALS
HEART RATE: 117 BPM | HEIGHT: 63 IN | RESPIRATION RATE: 18 BRPM | SYSTOLIC BLOOD PRESSURE: 124 MMHG | DIASTOLIC BLOOD PRESSURE: 70 MMHG | TEMPERATURE: 100 F | OXYGEN SATURATION: 99 % | WEIGHT: 184.09 LBS

## 2023-03-02 DIAGNOSIS — R04.0 EPISTAXIS: ICD-10-CM

## 2023-03-02 DIAGNOSIS — Z88.5 ALLERGY STATUS TO NARCOTIC AGENT: ICD-10-CM

## 2023-03-02 PROCEDURE — 99283 EMERGENCY DEPT VISIT LOW MDM: CPT

## 2023-03-02 PROCEDURE — 99282 EMERGENCY DEPT VISIT SF MDM: CPT

## 2023-03-02 NOTE — ED PROVIDER NOTE - OBJECTIVE STATEMENT
Patient is a 67-year-old female here for evaluation of nasal packing removal status postplacement 2 days ago after an episode of atraumatic right nare epistaxis.  Patient denies weakness, lightheadedness, palpitations

## 2023-03-02 NOTE — ED PROVIDER NOTE - PATIENT PORTAL LINK FT
You can access the FollowMyHealth Patient Portal offered by [facility] by registering at the following website: [email or web address]. By joining [employer]’s FollowMyHealth portal, you will also be able to view your health information using other applications (apps) compatible with our system.

## 2023-03-02 NOTE — ED PROVIDER NOTE - CLINICAL SUMMARY MEDICAL DECISION MAKING FREE TEXT BOX
Patient here for removal of nasal packing.  Patient was packed 2 days ago and wanted to have the packing removed from right nare.  Here the right nare packing was removed and the patient was oozing blood from the  right nare.  Surgicel was placed and the patient was observed in the ED.  The patient was eager to leave and the patient left the emerge department prior to prolonged watching for rebleed.  Patient is aware she will need to return or follow-up with ENT if she has rebleeding that occurs.

## 2023-03-02 NOTE — ED PROVIDER NOTE - CARE PROVIDER_API CALL
Wood Hathaway)  Otolaryngology  [address] [place] [address], [address] [place] [address]  Phone: [phone]  Fax: [phone]  Follow Up Time:

## 2023-03-02 NOTE — ED PROVIDER NOTE - PHYSICAL EXAMINATION
VITAL SIGNS: I have reviewed nursing notes and confirm.  CONSTITUTIONAL: Well-developed; well-nourished; in no acute distress.   SKIN: skin exam is warm and dry, no acute rash.    HEAD: Normocephalic; atraumatic.  EYES: conjunctiva and sclera clear.  ENT: Nasal packing in place No nasal discharge; airway clear.  EXT: Normal ROM.  No clubbing, cyanosis or edema.   NEURO: Alert, oriented, grossly unremarkable

## 2023-03-02 NOTE — ED ADULT TRIAGE NOTE - CHIEF COMPLAINT QUOTE
Patient with rhino rocket o R nare now causing pain and pressure. Patient requesting to have it removed.

## 2023-03-02 NOTE — ED PROVIDER NOTE - PROGRESS NOTE DETAILS
Nasal packing removed with persistent oozing, Surgicel placed in right nare will reassess Patient has not bled through Surgicel since it was placed requesting to be discharged home with follow-up for ENT.  Will DC, strict return precautions given

## 2023-03-02 NOTE — ED ADULT NURSE NOTE - NSICDXPASTMEDICALHX_GEN_ALL_CORE_FT
Patient PMHx anxiety, hyperlipidemia presents with feeling "shaky". denies palpitations, SOB, chest pain, nausea, vomiting, dizziness. admits to increased stress in her life. denies recreational drug use.
PAST MEDICAL HISTORY:  Anemia

## 2023-08-15 NOTE — ED PROVIDER NOTE - ENMT, MLM
Addended by: Lino Almeida on: 8/15/2023 04:06 PM     Modules accepted: Orders
Airway patent, Nasal mucosa clear. Mouth with normal mucosa. Throat has no vesicles, no oropharyngeal exudates and uvula is midline.

## 2023-11-12 ENCOUNTER — NON-APPOINTMENT (OUTPATIENT)
Age: 68
End: 2023-11-12

## 2023-11-27 ENCOUNTER — NON-APPOINTMENT (OUTPATIENT)
Age: 68
End: 2023-11-27

## 2023-11-28 ENCOUNTER — EMERGENCY (EMERGENCY)
Facility: HOSPITAL | Age: 68
LOS: 0 days | Discharge: HOME | End: 2023-11-28
Attending: STUDENT IN AN ORGANIZED HEALTH CARE EDUCATION/TRAINING PROGRAM
Payer: MEDICARE

## 2023-11-28 VITALS
OXYGEN SATURATION: 100 % | WEIGHT: 179.9 LBS | RESPIRATION RATE: 19 BRPM | SYSTOLIC BLOOD PRESSURE: 157 MMHG | TEMPERATURE: 98 F | DIASTOLIC BLOOD PRESSURE: 70 MMHG | HEART RATE: 78 BPM

## 2023-11-28 DIAGNOSIS — M79.661 PAIN IN RIGHT LOWER LEG: ICD-10-CM

## 2023-11-28 DIAGNOSIS — E11.9 TYPE 2 DIABETES MELLITUS WITHOUT COMPLICATIONS: ICD-10-CM

## 2023-11-28 DIAGNOSIS — E03.9 HYPOTHYROIDISM, UNSPECIFIED: ICD-10-CM

## 2023-11-28 DIAGNOSIS — Z88.6 ALLERGY STATUS TO ANALGESIC AGENT: ICD-10-CM

## 2023-11-28 DIAGNOSIS — I10 ESSENTIAL (PRIMARY) HYPERTENSION: ICD-10-CM

## 2023-11-28 DIAGNOSIS — Z86.718 PERSONAL HISTORY OF OTHER VENOUS THROMBOSIS AND EMBOLISM: ICD-10-CM

## 2023-11-28 PROCEDURE — 93970 EXTREMITY STUDY: CPT

## 2023-11-28 PROCEDURE — 93970 EXTREMITY STUDY: CPT | Mod: 26

## 2023-11-28 PROCEDURE — 73590 X-RAY EXAM OF LOWER LEG: CPT | Mod: RT

## 2023-11-28 PROCEDURE — 99285 EMERGENCY DEPT VISIT HI MDM: CPT

## 2023-11-28 PROCEDURE — 73590 X-RAY EXAM OF LOWER LEG: CPT | Mod: 26,RT

## 2023-11-28 PROCEDURE — 99284 EMERGENCY DEPT VISIT MOD MDM: CPT | Mod: 25

## 2023-11-28 RX ORDER — IBUPROFEN 200 MG
1 TABLET ORAL
Qty: 20 | Refills: 0
Start: 2023-11-28 | End: 2023-12-02

## 2023-11-28 RX ORDER — IBUPROFEN 200 MG
600 TABLET ORAL ONCE
Refills: 0 | Status: COMPLETED | OUTPATIENT
Start: 2023-11-28 | End: 2023-11-28

## 2023-11-28 RX ADMIN — Medication 600 MILLIGRAM(S): at 12:52

## 2023-11-28 NOTE — ED PROVIDER NOTE - CARE PROVIDER_API CALL
Gregorio Aragon  Vascular Surgery  [address] [place] [address], [address]  [place] [address]  Phone: [phone]  Fax: [phone]  Follow Up Time: 1-3 Days

## 2023-11-28 NOTE — ED PROVIDER NOTE - PATIENT PORTAL LINK FT
You can access the FollowMyHealth Patient Portal offered by [facility] by registering at the following website: [email or web address]. By joining [employer]’s FollowMyHealth portal, you will also be able to view your health information using other applications (apps) compatible with our system.

## 2023-11-28 NOTE — ED PROVIDER NOTE - PHYSICAL EXAMINATION
VITAL SIGNS: I have reviewed nursing notes and confirm.  CONSTITUTIONAL: non-toxic, well appearing  SKIN: no rash, no petechiae.  EYES: pink conjunctiva, anicteric  ENT: tongue midline, no exudates, MMM  NECK: Supple; no meningismus, no JVD  CARD: RRR, no murmurs   RESP: no respiratory distress, no tachypnea   EXT: Normal ROM x4. + R calf pain tenderness, no skin changes, distal pulses intact b/l, no LE edema   NEURO: Alert, oriented, equal strength bilaterally, nl gait.  PSYCH: Cooperative, appropriate.

## 2023-11-28 NOTE — ED ADULT TRIAGE NOTE - CHIEF COMPLAINT QUOTE
Patient complaining of Right leg pain, No redness or warmth noted in triage patient denies recent travel

## 2023-11-28 NOTE — ED PROVIDER NOTE - CLINICAL SUMMARY MEDICAL DECISION MAKING FREE TEXT BOX
69 yo F presented to ED with R leg pain.  Imaging was ordered and reviewed by me.  No fx or DVT. Appropriate medications for patient's presenting complaints were ordered and effects were reassessed.  Patient's records (prior hospital, ED visit, and/or nursing home notes if available) were reviewed.  Additional history was obtained from EMS, family, and/or PCP (where available).  Escalation to admission/observation was considered.  However patient feels much better and is comfortable with discharge.  Appropriate follow-up was arranged. Return precautions discussed in detail.

## 2023-11-28 NOTE — ED PROVIDER NOTE - CARE PLAN
Principal Discharge DX:	Leg pain  Assessment and plan of treatment:	plan- xr dvt study motrin reassess   1

## 2023-11-28 NOTE — ED PROVIDER NOTE - ATTENDING CONTRIBUTION TO CARE
DISPLAY PLAN FREE TEXT
69 yo F PMHx varicose veins follows w/ vascular, DM, HTN, hypothyroidism presents to ED for eval of R leg pain. Pt reports pain is at the R calf, no fevers, pain is 4 days intermittent. No numbness or tingling. Pt reports mechanical fall 2 months ago at work, no HT or LOC. No fevers or chills.     CONSTITUTIONAL: NAD.   SKIN: warm, dry  HEAD: Normocephalic; atraumatic.  EYES: no conjunctival injection.   ENT: MMM.   NECK: Supple.  CARD: RRR.   RESP: No wheezes, rales or rhonchi.  ABD: soft ntnd.   EXT: Normal ROM. +R calf pain tenderness distal pulses intact b/l no LE edema   NEURO: AAOx3, CN 2-12 grossly intact. +5/5 strength and sensation wnl in all extremities. No pronator drift, no dysarthria.   PSYCH: Cooperative, appropriate.

## 2023-11-28 NOTE — ED PROVIDER NOTE - OBJECTIVE STATEMENT
69 y/o F w pMHx of DM, HTN, low thyroid, varicose veins follows up with vascular presents to ED with R leg pain x 4 days, intermittent. Pt reports mechanical fall 2 months ago at work, no HT or LOC at that time. Neymar to ambulate w no difficulty. Denies f/c, cp, cough, sob, numbness or tingling.

## 2024-03-23 ENCOUNTER — NON-APPOINTMENT (OUTPATIENT)
Age: 69
End: 2024-03-23

## 2024-06-11 NOTE — ED ADULT NURSE NOTE - NS ED NURSE RECORD ANOTHER VITAL SIGN
Patient is having trouble getting his EMG scheduled, would like to speak with an RN to get some help. Please call him at [phone]   Yes

## 2025-07-16 ENCOUNTER — EMERGENCY (EMERGENCY)
Facility: HOSPITAL | Age: 70
LOS: 0 days | Discharge: HOME | End: 2025-07-16
Attending: STUDENT IN AN ORGANIZED HEALTH CARE EDUCATION/TRAINING PROGRAM
Payer: MEDICARE

## 2025-07-16 VITALS
RESPIRATION RATE: 16 BRPM | HEIGHT: 63 IN | DIASTOLIC BLOOD PRESSURE: 66 MMHG | HEART RATE: 71 BPM | WEIGHT: 195.99 LBS | OXYGEN SATURATION: 97 % | TEMPERATURE: 99 F | SYSTOLIC BLOOD PRESSURE: 141 MMHG

## 2025-07-16 DIAGNOSIS — N39.0 URINARY TRACT INFECTION, SITE NOT SPECIFIED: ICD-10-CM

## 2025-07-16 DIAGNOSIS — E11.9 TYPE 2 DIABETES MELLITUS WITHOUT COMPLICATIONS: ICD-10-CM

## 2025-07-16 DIAGNOSIS — Z87.442 PERSONAL HISTORY OF URINARY CALCULI: ICD-10-CM

## 2025-07-16 DIAGNOSIS — Z88.8 ALLERGY STATUS TO OTHER DRUGS, MEDICAMENTS AND BIOLOGICAL SUBSTANCES: ICD-10-CM

## 2025-07-16 DIAGNOSIS — I10 ESSENTIAL (PRIMARY) HYPERTENSION: ICD-10-CM

## 2025-07-16 DIAGNOSIS — Z98.84 BARIATRIC SURGERY STATUS: ICD-10-CM

## 2025-07-16 DIAGNOSIS — Z90.710 ACQUIRED ABSENCE OF BOTH CERVIX AND UTERUS: ICD-10-CM

## 2025-07-16 DIAGNOSIS — E78.5 HYPERLIPIDEMIA, UNSPECIFIED: ICD-10-CM

## 2025-07-16 LAB
ALBUMIN SERPL ELPH-MCNC: 4.1 G/DL — SIGNIFICANT CHANGE UP (ref 3.5–5.2)
ALP SERPL-CCNC: 137 U/L — HIGH (ref 30–115)
ALT FLD-CCNC: 22 U/L — SIGNIFICANT CHANGE UP (ref 0–41)
ANION GAP SERPL CALC-SCNC: 11 MMOL/L — SIGNIFICANT CHANGE UP (ref 7–14)
APPEARANCE UR: ABNORMAL
AST SERPL-CCNC: 19 U/L — SIGNIFICANT CHANGE UP (ref 0–41)
BACTERIA # UR AUTO: ABNORMAL /HPF
BASOPHILS # BLD AUTO: 0.09 K/UL — SIGNIFICANT CHANGE UP (ref 0–0.2)
BASOPHILS NFR BLD AUTO: 1 % — SIGNIFICANT CHANGE UP (ref 0–1)
BILIRUB SERPL-MCNC: 0.3 MG/DL — SIGNIFICANT CHANGE UP (ref 0.2–1.2)
BILIRUB UR-MCNC: NEGATIVE — SIGNIFICANT CHANGE UP
BUN SERPL-MCNC: 15 MG/DL — SIGNIFICANT CHANGE UP (ref 10–20)
CALCIUM SERPL-MCNC: 8.8 MG/DL — SIGNIFICANT CHANGE UP (ref 8.4–10.5)
CAST: 0 /LPF — SIGNIFICANT CHANGE UP (ref 0–4)
CHLORIDE SERPL-SCNC: 103 MMOL/L — SIGNIFICANT CHANGE UP (ref 98–110)
CO2 SERPL-SCNC: 24 MMOL/L — SIGNIFICANT CHANGE UP (ref 17–32)
COLOR SPEC: YELLOW — SIGNIFICANT CHANGE UP
CREAT SERPL-MCNC: 0.5 MG/DL — LOW (ref 0.7–1.5)
DIFF PNL FLD: NEGATIVE — SIGNIFICANT CHANGE UP
EGFR: 101 ML/MIN/1.73M2 — SIGNIFICANT CHANGE UP
EGFR: 101 ML/MIN/1.73M2 — SIGNIFICANT CHANGE UP
EOSINOPHIL # BLD AUTO: 0.28 K/UL — SIGNIFICANT CHANGE UP (ref 0–0.7)
EOSINOPHIL NFR BLD AUTO: 3 % — SIGNIFICANT CHANGE UP (ref 0–8)
GLUCOSE SERPL-MCNC: 176 MG/DL — HIGH (ref 70–99)
GLUCOSE UR QL: 500 MG/DL
HCT VFR BLD CALC: 39.8 % — SIGNIFICANT CHANGE UP (ref 37–47)
HGB BLD-MCNC: 13.6 G/DL — SIGNIFICANT CHANGE UP (ref 12–16)
IMM GRANULOCYTES NFR BLD AUTO: 0.4 % — HIGH (ref 0.1–0.3)
KETONES UR QL: ABNORMAL MG/DL
LEUKOCYTE ESTERASE UR-ACNC: ABNORMAL
LIDOCAIN IGE QN: 18 U/L — SIGNIFICANT CHANGE UP (ref 7–60)
LYMPHOCYTES # BLD AUTO: 1.62 K/UL — SIGNIFICANT CHANGE UP (ref 1.2–3.4)
LYMPHOCYTES # BLD AUTO: 17.4 % — LOW (ref 20.5–51.1)
MCHC RBC-ENTMCNC: 28.9 PG — SIGNIFICANT CHANGE UP (ref 27–31)
MCHC RBC-ENTMCNC: 34.2 G/DL — SIGNIFICANT CHANGE UP (ref 32–37)
MCV RBC AUTO: 84.5 FL — SIGNIFICANT CHANGE UP (ref 81–99)
MONOCYTES # BLD AUTO: 0.77 K/UL — HIGH (ref 0.1–0.6)
MONOCYTES NFR BLD AUTO: 8.3 % — SIGNIFICANT CHANGE UP (ref 1.7–9.3)
NEUTROPHILS # BLD AUTO: 6.49 K/UL — SIGNIFICANT CHANGE UP (ref 1.4–6.5)
NEUTROPHILS NFR BLD AUTO: 69.9 % — SIGNIFICANT CHANGE UP (ref 42.2–75.2)
NITRITE UR-MCNC: POSITIVE
NRBC BLD AUTO-RTO: 0 /100 WBCS — SIGNIFICANT CHANGE UP (ref 0–0)
PH UR: 5.5 — SIGNIFICANT CHANGE UP (ref 5–8)
PLATELET # BLD AUTO: 215 K/UL — SIGNIFICANT CHANGE UP (ref 130–400)
PMV BLD: 11.7 FL — HIGH (ref 7.4–10.4)
POTASSIUM SERPL-MCNC: 4.6 MMOL/L — SIGNIFICANT CHANGE UP (ref 3.5–5)
POTASSIUM SERPL-SCNC: 4.6 MMOL/L — SIGNIFICANT CHANGE UP (ref 3.5–5)
PROT SERPL-MCNC: 6.3 G/DL — SIGNIFICANT CHANGE UP (ref 6–8)
PROT UR-MCNC: NEGATIVE MG/DL — SIGNIFICANT CHANGE UP
RBC # BLD: 4.71 M/UL — SIGNIFICANT CHANGE UP (ref 4.2–5.4)
RBC # FLD: 13.7 % — SIGNIFICANT CHANGE UP (ref 11.5–14.5)
RBC CASTS # UR COMP ASSIST: 2 /HPF — SIGNIFICANT CHANGE UP (ref 0–4)
SODIUM SERPL-SCNC: 138 MMOL/L — SIGNIFICANT CHANGE UP (ref 135–146)
SP GR SPEC: 1.02 — SIGNIFICANT CHANGE UP (ref 1–1.03)
SQUAMOUS # UR AUTO: 13 /HPF — HIGH (ref 0–5)
UROBILINOGEN FLD QL: 1 MG/DL — SIGNIFICANT CHANGE UP (ref 0.2–1)
WBC # BLD: 9.29 K/UL — SIGNIFICANT CHANGE UP (ref 4.8–10.8)
WBC # FLD AUTO: 9.29 K/UL — SIGNIFICANT CHANGE UP (ref 4.8–10.8)
WBC UR QL: 36 /HPF — HIGH (ref 0–5)

## 2025-07-16 PROCEDURE — 87186 SC STD MICRODIL/AGAR DIL: CPT

## 2025-07-16 PROCEDURE — 36415 COLL VENOUS BLD VENIPUNCTURE: CPT

## 2025-07-16 PROCEDURE — 80053 COMPREHEN METABOLIC PANEL: CPT

## 2025-07-16 PROCEDURE — 85025 COMPLETE CBC W/AUTO DIFF WBC: CPT

## 2025-07-16 PROCEDURE — 74177 CT ABD & PELVIS W/CONTRAST: CPT | Mod: 26

## 2025-07-16 PROCEDURE — 99285 EMERGENCY DEPT VISIT HI MDM: CPT

## 2025-07-16 PROCEDURE — 87086 URINE CULTURE/COLONY COUNT: CPT

## 2025-07-16 PROCEDURE — 81001 URINALYSIS AUTO W/SCOPE: CPT

## 2025-07-16 PROCEDURE — 99284 EMERGENCY DEPT VISIT MOD MDM: CPT | Mod: 25

## 2025-07-16 PROCEDURE — 74177 CT ABD & PELVIS W/CONTRAST: CPT

## 2025-07-16 PROCEDURE — 96374 THER/PROPH/DIAG INJ IV PUSH: CPT | Mod: XU

## 2025-07-16 PROCEDURE — 83690 ASSAY OF LIPASE: CPT

## 2025-07-16 RX ORDER — CEFPODOXIME PROXETIL 200 MG/1
1 TABLET, FILM COATED ORAL
Qty: 28 | Refills: 0
Start: 2025-07-16 | End: 2025-07-29

## 2025-07-16 RX ORDER — SODIUM CHLORIDE 9 G/1000ML
1000 INJECTION, SOLUTION INTRAVENOUS ONCE
Refills: 0 | Status: COMPLETED | OUTPATIENT
Start: 2025-07-16 | End: 2025-07-16

## 2025-07-16 RX ORDER — KETOROLAC TROMETHAMINE 30 MG/ML
15 INJECTION, SOLUTION INTRAMUSCULAR; INTRAVENOUS ONCE
Refills: 0 | Status: DISCONTINUED | OUTPATIENT
Start: 2025-07-16 | End: 2025-07-16

## 2025-07-16 RX ORDER — CEFPODOXIME PROXETIL 200 MG/1
200 TABLET, FILM COATED ORAL ONCE
Refills: 0 | Status: COMPLETED | OUTPATIENT
Start: 2025-07-16 | End: 2025-07-16

## 2025-07-16 RX ADMIN — CEFPODOXIME PROXETIL 200 MILLIGRAM(S): 200 TABLET, FILM COATED ORAL at 20:33

## 2025-07-16 RX ADMIN — KETOROLAC TROMETHAMINE 15 MILLIGRAM(S): 30 INJECTION, SOLUTION INTRAMUSCULAR; INTRAVENOUS at 17:34

## 2025-07-16 RX ADMIN — SODIUM CHLORIDE 500 MILLILITER(S): 9 INJECTION, SOLUTION INTRAVENOUS at 17:34

## 2025-07-16 NOTE — ED PROVIDER NOTE - OBJECTIVE STATEMENT
Patient with history of anemia, kidney stones, diabetes, hypertension, hypothyroidism.  Patient presents with right flank pain for the last 2 days.  Also has frequency of urination which she attributes to her sugars being elevated.  Denies nausea vomiting or diarrhea fevers or chills.  The flank pain is similar to previous kidney stones according to the patient.  Patient has a surgical history of gastric bypass, hysterectomy.

## 2025-07-16 NOTE — ED PROVIDER NOTE - WET READ LAUNCH FT
I am ordering a blood pressure cuff for home.  I do believe you pick this up at the medical supply show room at the [facility].  If it is too expensive and cheaper for you to buy over-the-counter you could do that.    Then we will have you check your blood pressure weekly at home.    Then we will help set a physical in 3 months.    Now with a new diagnosis of high blood pressure we will get an EKG, urinalysis for protein, renal profile.    Also are starting a diuretic called hydrochlorothiazide.  Will start 25 mg to take daily in the morning.    My nurse will help set a nurse appointment in 2 weeks for blood pressure check to ensure the medication is bringing her blood pressure down.    We want you to have a low-salt diet.    Ideally exercising 30 to 45 minutes daily.    Ordering a testicular and scrotal ultrasound.  Radiology should call you but if they do not call [phone] to set that up.  That will be at the [facility] or [facility].    I am also referring you to urology.  [facility] to [facility] care as prescribed your provider. If you don t hear from a representative within 2 business days, please call [phone].   
There are no Wet Read(s) to document.

## 2025-07-16 NOTE — ED PROVIDER NOTE - CARE PROVIDERS DIRECT ADDRESSES
,DirectAddress_Unknown,[email or web address]

## 2025-07-16 NOTE — ED PROVIDER NOTE - PHYSICAL EXAMINATION
CONST: Well appearing in NAD  EYES: PERRL, EOMI, Sclera and conjunctiva clear.   NECK: Non-tender, no meningeal signs  CARD: Normal S1 S2; Normal rate and rhythm  RESP: Equal BS B/L, No wheezes, rhonchi or rales. No distress  GI: Soft, non-tender, non-distended. Mild right CVAT  MS: Normal ROM in all extremities. No midline spinal tenderness.  SKIN: Warm, dry, no acute rashes. Good turgor  NEURO: A&Ox3, No focal deficits. Strength 5/5 with no sensory deficits. Steady gait

## 2025-07-16 NOTE — ED PROVIDER NOTE - CLINICAL SUMMARY MEDICAL DECISION MAKING FREE TEXT BOX
70-year-old female with PMH kidney stone that usually spontaneously passed here with right-sided flank pain for the past 2 days, intermittent in nature.  No fevers or dysuria, but has increased urinary frequency.  Imaging negative for any acute pyelonephritis or kidney stone, but has multiple incidental findings which were relayed to patient that she should follow-up outpatient with her PCP.  Patient to be discharged with antibiotics. Aware of all results, given a copy of all available results, comfortable with discharge and follow-up outpatient, strict return precautions given. Endorses understanding of all of this and aware that they can return at any time for new or concerning symptoms. No further questions or concerns at this time

## 2025-07-16 NOTE — ED PROVIDER NOTE - PROGRESS NOTE DETAILS
SG: Patient was found to have an incidental finding on their imaging in the emergency department today.  A copy of the result was given to the patient, and they were advised to follow up with her primary doctor.  They are aware that this may be very important and may require further work-up and repeat imaging.  All questions and concerns about this finding were addressed.

## 2025-07-16 NOTE — ED PROVIDER NOTE - PATIENT PORTAL LINK FT
You can access the FollowMyHealth Patient Portal offered by [facility] by registering at the following website: [email or web address]. By joining [facility]’s FollowMyHealth portal, you will also be able to view your health information using other applications (apps) compatible with our system.

## 2025-07-16 NOTE — ED PROVIDER NOTE - NSFOLLOWUPINSTRUCTIONS_ED_ALL_ED_FT
Urinary Tract Infection    You were seen and evelauted in the Emergency Department. It was found to you have a Urinary Tract Infection (UTI). It has been determined that it is safe for you to be discharged and attempt outpatient management. Please take antibiotic as prescribed and monitor your symptoms. If your antibiotic needs to be changed based on test results, you will be contacted. There is a chance you may have to return for re-evaluation and possible admission if oral antibiotics are not working. Please monitor your symptoms and see how you feel after 48 hours of antibiotics, unless one of the strict return precautions we discussed happens. Follow up with your doctor and bring all of your results. You may require further work up and management, which may include evaluaiton by a specialist (Urologist)    Overview  A urinary tract infection, or UTI, is a general term for an infection anywhere between the kidneys and the urethra (where urine comes out). Most UTIs are bladder infections. They often cause pain or burning when you urinate.    UTIs are caused by bacteria and can be cured with antibiotics. Be sure to complete your treatment so that the infection does not get worse.    Follow-up care is a key part of your treatment and safety. Be sure to make and go to all appointments, and call your doctor or nurse call line if you are having problems. It's also a good idea to know your test results and keep a list of the medicines you take.    How can you care for yourself at home?  Take your antibiotics as directed. Do not stop taking them just because you feel better. You need to take the full course of antibiotics.  Drink extra water and other fluids for the next day or two. This will help make the urine less concentrated and help wash out the bacteria that are causing the infection. (If you have kidney, heart, or liver disease and have to limit fluids, talk with your doctor before you increase the amount of fluids you drink.)  Avoid drinks that are carbonated or have caffeine. They can irritate the bladder.  Urinate often. Try to empty your bladder each time.  To relieve pain, take a hot bath or lay a heating pad set on low over your lower belly or genital area. Never go to sleep with a heating pad in place.  To prevent UTIs  Drink plenty of water each day. This helps you urinate often, which clears bacteria from your system. (If you have kidney, heart, or liver disease and have to limit fluids, talk with your doctor before you increase the amount of fluids you drink.)  Urinate when you need to.  If you are sexually active, urinate right after you have sex.  Change sanitary pads often.  Avoid douches, bubble baths, feminine hygiene sprays, and other feminine hygiene products that have deodorants.  After you use the toilet, wipe from front to back.  When should you call for help?  	  Call your doctor or nurse call line now or seek immediate medical care if:    Symptoms such as fever, chills, nausea, or vomiting get worse or appear for the first time.  You have new pain in your back just below your rib cage. This is called flank pain.  There is new blood or pus in your urine.  You have any problems with your antibiotic medicine.  Watch closely for changes in your health, and be sure to contact your doctor or nurse call line if:    You do not feel better after 2 days on an antibiotic.  Your symptoms go away but then come back. WE DISCUSSED THE INCIDENTAL FINDINGS ON THE CT WITH YOU WHICH INCLUDE: A 1.9 x 0.7 cm hypodense defect with focal calcifications is noted in the   dome of the liver. Hepatic MRI may be obtained for further evaluation.    Areas of sclerosis are noted in the proximal left femur, right iliac   bone, and vertebral bodies of S1, T12 and T7. MRI of the spine may be   obtained for further evaluation.    PLEASE FOLLOW UP WITH YOUR PCP FOR FUTHER EVALUATION    Urinary Tract Infection    You were seen and evelauted in the Emergency Department. It was found to you have a Urinary Tract Infection (UTI). It has been determined that it is safe for you to be discharged and attempt outpatient management. Please take antibiotic as prescribed and monitor your symptoms. If your antibiotic needs to be changed based on test results, you will be contacted. There is a chance you may have to return for re-evaluation and possible admission if oral antibiotics are not working. Please monitor your symptoms and see how you feel after 48 hours of antibiotics, unless one of the strict return precautions we discussed happens. Follow up with your doctor and bring all of your results. You may require further work up and management, which may include evaluaiton by a specialist (Urologist)    Overview  A urinary tract infection, or UTI, is a general term for an infection anywhere between the kidneys and the urethra (where urine comes out). Most UTIs are bladder infections. They often cause pain or burning when you urinate.    UTIs are caused by bacteria and can be cured with antibiotics. Be sure to complete your treatment so that the infection does not get worse.    Follow-up care is a key part of your treatment and safety. Be sure to make and go to all appointments, and call your doctor or nurse call line if you are having problems. It's also a good idea to know your test results and keep a list of the medicines you take.    How can you care for yourself at home?  Take your antibiotics as directed. Do not stop taking them just because you feel better. You need to take the full course of antibiotics.  Drink extra water and other fluids for the next day or two. This will help make the urine less concentrated and help wash out the bacteria that are causing the infection. (If you have kidney, heart, or liver disease and have to limit fluids, talk with your doctor before you increase the amount of fluids you drink.)  Avoid drinks that are carbonated or have caffeine. They can irritate the bladder.  Urinate often. Try to empty your bladder each time.  To relieve pain, take a hot bath or lay a heating pad set on low over your lower belly or genital area. Never go to sleep with a heating pad in place.  To prevent UTIs  Drink plenty of water each day. This helps you urinate often, which clears bacteria from your system. (If you have kidney, heart, or liver disease and have to limit fluids, talk with your doctor before you increase the amount of fluids you drink.)  Urinate when you need to.  If you are sexually active, urinate right after you have sex.  Change sanitary pads often.  Avoid douches, bubble baths, feminine hygiene sprays, and other feminine hygiene products that have deodorants.  After you use the toilet, wipe from front to back.  When should you call for help?  	  Call your doctor or nurse call line now or seek immediate medical care if:    Symptoms such as fever, chills, nausea, or vomiting get worse or appear for the first time.  You have new pain in your back just below your rib cage. This is called flank pain.  There is new blood or pus in your urine.  You have any problems with your antibiotic medicine.  Watch closely for changes in your health, and be sure to contact your doctor or nurse call line if:    You do not feel better after 2 days on an antibiotic.  Your symptoms go away but then come back.

## 2025-07-16 NOTE — ED PROVIDER NOTE - CARE PROVIDER_API CALL
YOUR PRIMARY CARE DOCTOR,   Phone: (   )    -  Fax: (   )    -  Follow Up Time: 7-10 Days    Agu[place] Romo)  Gastroenterology  [address] [place] [id number]  Phone: [phone]  Fax: [phone]  Follow Up Time: 7-10 Days

## 2025-07-17 NOTE — CHART NOTE - NSCHARTNOTEFT_GEN_A_CORE
Appt scheduled 09/17/2025 12:45 PM w/ Dr. Nevarez @ [address] [place], [place] 7/17 (gastro referral).

## 2025-07-19 LAB
-  AMOXICILLIN/CLAVULANIC ACID: SIGNIFICANT CHANGE UP
-  AMOXICILLIN/CLAVULANIC ACID: SIGNIFICANT CHANGE UP
-  AMPICILLIN/SULBACTAM: SIGNIFICANT CHANGE UP
-  AMPICILLIN/SULBACTAM: SIGNIFICANT CHANGE UP
-  AMPICILLIN: SIGNIFICANT CHANGE UP
-  AMPICILLIN: SIGNIFICANT CHANGE UP
-  AZTREONAM: SIGNIFICANT CHANGE UP
-  AZTREONAM: SIGNIFICANT CHANGE UP
-  CEFAZOLIN: SIGNIFICANT CHANGE UP
-  CEFAZOLIN: SIGNIFICANT CHANGE UP
-  CEFEPIME: SIGNIFICANT CHANGE UP
-  CEFEPIME: SIGNIFICANT CHANGE UP
-  CEFOXITIN: SIGNIFICANT CHANGE UP
-  CEFOXITIN: SIGNIFICANT CHANGE UP
-  CEFTRIAXONE: SIGNIFICANT CHANGE UP
-  CEFTRIAXONE: SIGNIFICANT CHANGE UP
-  CEFUROXIME: SIGNIFICANT CHANGE UP
-  CEFUROXIME: SIGNIFICANT CHANGE UP
-  CIPROFLOXACIN: SIGNIFICANT CHANGE UP
-  CIPROFLOXACIN: SIGNIFICANT CHANGE UP
-  ERTAPENEM: SIGNIFICANT CHANGE UP
-  ERTAPENEM: SIGNIFICANT CHANGE UP
-  GENTAMICIN: SIGNIFICANT CHANGE UP
-  GENTAMICIN: SIGNIFICANT CHANGE UP
-  IMIPENEM: SIGNIFICANT CHANGE UP
-  IMIPENEM: SIGNIFICANT CHANGE UP
-  LEVOFLOXACIN: SIGNIFICANT CHANGE UP
-  LEVOFLOXACIN: SIGNIFICANT CHANGE UP
-  MEROPENEM: SIGNIFICANT CHANGE UP
-  MEROPENEM: SIGNIFICANT CHANGE UP
-  NITROFURANTOIN: SIGNIFICANT CHANGE UP
-  NITROFURANTOIN: SIGNIFICANT CHANGE UP
-  PIPERACILLIN/TAZOBACTAM: SIGNIFICANT CHANGE UP
-  PIPERACILLIN/TAZOBACTAM: SIGNIFICANT CHANGE UP
-  TIGECYCLINE: SIGNIFICANT CHANGE UP
-  TIGECYCLINE: SIGNIFICANT CHANGE UP
-  TOBRAMYCIN: SIGNIFICANT CHANGE UP
-  TOBRAMYCIN: SIGNIFICANT CHANGE UP
-  TRIMETHOPRIM/SULFAMETHOXAZOLE: SIGNIFICANT CHANGE UP
-  TRIMETHOPRIM/SULFAMETHOXAZOLE: SIGNIFICANT CHANGE UP
CULTURE RESULTS: ABNORMAL
METHOD TYPE: SIGNIFICANT CHANGE UP
METHOD TYPE: SIGNIFICANT CHANGE UP
ORGANISM # SPEC MICROSCOPIC CNT: ABNORMAL
ORGANISM # SPEC MICROSCOPIC CNT: ABNORMAL
ORGANISM # SPEC MICROSCOPIC CNT: SIGNIFICANT CHANGE UP
SPECIMEN SOURCE: SIGNIFICANT CHANGE UP

## 2025-07-30 ENCOUNTER — TRANSCRIPTION ENCOUNTER (OUTPATIENT)
Age: 70
End: 2025-07-30

## 2025-08-23 ENCOUNTER — OUTPATIENT (OUTPATIENT)
Dept: OUTPATIENT SERVICES | Facility: HOSPITAL | Age: 70
LOS: 1 days | End: 2025-08-23
Payer: MEDICARE

## 2025-08-23 DIAGNOSIS — I25.10 ATHEROSCLEROTIC HEART DISEASE OF NATIVE CORONARY ARTERY WITHOUT ANGINA PECTORIS: ICD-10-CM

## 2025-08-23 DIAGNOSIS — Z00.8 ENCOUNTER FOR OTHER GENERAL EXAMINATION: ICD-10-CM

## 2025-08-23 PROCEDURE — 75571 CT HRT W/O DYE W/CA TEST: CPT | Mod: 26

## 2025-08-23 PROCEDURE — 75571 CT HRT W/O DYE W/CA TEST: CPT

## 2025-08-24 DIAGNOSIS — I25.10 ATHEROSCLEROTIC HEART DISEASE OF NATIVE CORONARY ARTERY WITHOUT ANGINA PECTORIS: ICD-10-CM

## 2025-09-17 ENCOUNTER — APPOINTMENT (OUTPATIENT)
Dept: GASTROENTEROLOGY | Facility: CLINIC | Age: 70
End: 2025-09-17